# Patient Record
Sex: FEMALE | Race: WHITE | Employment: FULL TIME | URBAN - METROPOLITAN AREA
[De-identification: names, ages, dates, MRNs, and addresses within clinical notes are randomized per-mention and may not be internally consistent; named-entity substitution may affect disease eponyms.]

---

## 2023-06-02 ENCOUNTER — TELEPHONE (OUTPATIENT)
Dept: FAMILY MEDICINE CLINIC | Facility: CLINIC | Age: 53
End: 2023-06-02

## 2023-06-02 ENCOUNTER — OFFICE VISIT (OUTPATIENT)
Dept: FAMILY MEDICINE CLINIC | Facility: CLINIC | Age: 53
End: 2023-06-02
Payer: COMMERCIAL

## 2023-06-02 VITALS
OXYGEN SATURATION: 98 % | BODY MASS INDEX: 34.6 KG/M2 | SYSTOLIC BLOOD PRESSURE: 120 MMHG | HEART RATE: 79 BPM | WEIGHT: 239 LBS | RESPIRATION RATE: 18 BRPM | DIASTOLIC BLOOD PRESSURE: 84 MMHG | HEIGHT: 69.5 IN

## 2023-06-02 DIAGNOSIS — Z13.29 SCREENING FOR ENDOCRINE, NUTRITIONAL, METABOLIC AND IMMUNITY DISORDER: ICD-10-CM

## 2023-06-02 DIAGNOSIS — Z13.21 SCREENING FOR ENDOCRINE, NUTRITIONAL, METABOLIC AND IMMUNITY DISORDER: ICD-10-CM

## 2023-06-02 DIAGNOSIS — M72.2 PLANTAR FASCIITIS OF LEFT FOOT: ICD-10-CM

## 2023-06-02 DIAGNOSIS — Z00.00 ROUTINE GENERAL MEDICAL EXAMINATION AT A HEALTH CARE FACILITY: Primary | ICD-10-CM

## 2023-06-02 DIAGNOSIS — Z13.228 SCREENING FOR ENDOCRINE, NUTRITIONAL, METABOLIC AND IMMUNITY DISORDER: ICD-10-CM

## 2023-06-02 DIAGNOSIS — M54.32 SCIATICA OF LEFT SIDE: ICD-10-CM

## 2023-06-02 DIAGNOSIS — Z13.0 SCREENING FOR ENDOCRINE, NUTRITIONAL, METABOLIC AND IMMUNITY DISORDER: ICD-10-CM

## 2023-06-02 DIAGNOSIS — Z12.31 ENCOUNTER FOR SCREENING MAMMOGRAM FOR MALIGNANT NEOPLASM OF BREAST: ICD-10-CM

## 2023-06-02 DIAGNOSIS — R10.12 LUQ PAIN: ICD-10-CM

## 2023-06-02 PROCEDURE — 3008F BODY MASS INDEX DOCD: CPT | Performed by: FAMILY MEDICINE

## 2023-06-02 PROCEDURE — 99386 PREV VISIT NEW AGE 40-64: CPT | Performed by: FAMILY MEDICINE

## 2023-06-02 PROCEDURE — 3079F DIAST BP 80-89 MM HG: CPT | Performed by: FAMILY MEDICINE

## 2023-06-02 PROCEDURE — 3074F SYST BP LT 130 MM HG: CPT | Performed by: FAMILY MEDICINE

## 2023-06-02 RX ORDER — MULTIVIT-MIN/IRON/FOLIC ACID/K 18-600-40
1000 CAPSULE ORAL DAILY
COMMUNITY

## 2023-06-02 RX ORDER — ESTRADIOL 0.5 MG/1
0.5 TABLET ORAL DAILY
COMMUNITY
Start: 2023-03-14

## 2023-06-02 RX ORDER — PROGESTERONE 200 MG/1
CAPSULE ORAL
COMMUNITY
Start: 2023-03-20

## 2023-06-02 RX ORDER — CALCIUM CARBONATE 260MG(650)
100 TABLET,CHEWABLE ORAL DAILY
COMMUNITY

## 2023-06-02 NOTE — TELEPHONE ENCOUNTER
KELLY faxed to Dr. Edinson Rowland to obtain pt last pap. KELLY also faxed to [address] to obtain pt last mammogram. Both placed in Dr. Prince Mahmood folder. Sending pt [employer] message to get colonoscopy info as I was unable to find online. Will then fax KELLY.

## 2023-06-05 ENCOUNTER — LAB ENCOUNTER (OUTPATIENT)
Dept: LAB | Age: 53
End: 2023-06-05
Attending: FAMILY MEDICINE
Payer: COMMERCIAL

## 2023-06-05 DIAGNOSIS — R10.12 LUQ PAIN: ICD-10-CM

## 2023-06-05 DIAGNOSIS — Z13.228 SCREENING FOR ENDOCRINE, NUTRITIONAL, METABOLIC AND IMMUNITY DISORDER: ICD-10-CM

## 2023-06-05 DIAGNOSIS — Z13.0 SCREENING FOR ENDOCRINE, NUTRITIONAL, METABOLIC AND IMMUNITY DISORDER: ICD-10-CM

## 2023-06-05 DIAGNOSIS — Z13.29 SCREENING FOR ENDOCRINE, NUTRITIONAL, METABOLIC AND IMMUNITY DISORDER: ICD-10-CM

## 2023-06-05 DIAGNOSIS — Z13.21 SCREENING FOR ENDOCRINE, NUTRITIONAL, METABOLIC AND IMMUNITY DISORDER: ICD-10-CM

## 2023-06-05 LAB
BASOPHILS # BLD AUTO: 0.04 X10(3) UL (ref 0–0.2)
BASOPHILS NFR BLD AUTO: 0.6 %
CANCER AG19-9 SERPL-ACNC: 6.9 U/ML (ref ?–37)
EOSINOPHIL # BLD AUTO: 0.06 X10(3) UL (ref 0–0.7)
EOSINOPHIL NFR BLD AUTO: 0.8 %
ERYTHROCYTE [DISTWIDTH] IN BLOOD BY AUTOMATED COUNT: 12.8 %
HCT VFR BLD AUTO: 40.4 %
HGB BLD-MCNC: 13.3 G/DL
IMM GRANULOCYTES # BLD AUTO: 0.02 X10(3) UL (ref 0–1)
IMM GRANULOCYTES NFR BLD: 0.3 %
LIPASE SERPL-CCNC: 32 U/L (ref 13–75)
LYMPHOCYTES # BLD AUTO: 2.76 X10(3) UL (ref 1–4)
LYMPHOCYTES NFR BLD AUTO: 39 %
MAGNESIUM SERPL-MCNC: 2 MG/DL (ref 1.6–2.6)
MCH RBC QN AUTO: 29.8 PG (ref 26–34)
MCHC RBC AUTO-ENTMCNC: 32.9 G/DL (ref 31–37)
MCV RBC AUTO: 90.6 FL
MONOCYTES # BLD AUTO: 0.4 X10(3) UL (ref 0.1–1)
MONOCYTES NFR BLD AUTO: 5.7 %
NEUTROPHILS # BLD AUTO: 3.79 X10 (3) UL (ref 1.5–7.7)
NEUTROPHILS # BLD AUTO: 3.79 X10(3) UL (ref 1.5–7.7)
NEUTROPHILS NFR BLD AUTO: 53.6 %
PLATELET # BLD AUTO: 228 10(3)UL (ref 150–450)
RBC # BLD AUTO: 4.46 X10(6)UL
TSI SER-ACNC: 1.5 MIU/ML (ref 0.36–3.74)
VIT D+METAB SERPL-MCNC: 20.5 NG/ML (ref 30–100)
WBC # BLD AUTO: 7.1 X10(3) UL (ref 4–11)

## 2023-06-05 PROCEDURE — 82306 VITAMIN D 25 HYDROXY: CPT | Performed by: FAMILY MEDICINE

## 2023-06-05 PROCEDURE — 83690 ASSAY OF LIPASE: CPT | Performed by: FAMILY MEDICINE

## 2023-06-05 PROCEDURE — 84443 ASSAY THYROID STIM HORMONE: CPT | Performed by: FAMILY MEDICINE

## 2023-06-05 PROCEDURE — 83735 ASSAY OF MAGNESIUM: CPT | Performed by: FAMILY MEDICINE

## 2023-06-05 PROCEDURE — 85025 COMPLETE CBC W/AUTO DIFF WBC: CPT | Performed by: FAMILY MEDICINE

## 2023-06-05 PROCEDURE — 86301 IMMUNOASSAY TUMOR CA 19-9: CPT | Performed by: FAMILY MEDICINE

## 2023-06-05 NOTE — TELEPHONE ENCOUNTER
Mammogram and pap report received. Placed in Dr. Alisson Richardson for review and will then send to scan.

## 2023-06-08 ENCOUNTER — PATIENT MESSAGE (OUTPATIENT)
Dept: FAMILY MEDICINE CLINIC | Facility: CLINIC | Age: 53
End: 2023-06-08

## 2023-06-08 DIAGNOSIS — E66.9 CLASS 1 OBESITY WITHOUT SERIOUS COMORBIDITY WITH BODY MASS INDEX (BMI) OF 34.0 TO 34.9 IN ADULT, UNSPECIFIED OBESITY TYPE: Primary | ICD-10-CM

## 2023-06-09 NOTE — TELEPHONE ENCOUNTER
From: Rosetta Connelly  To: Stephenie Duke MD  Sent: 6/8/2023 2:27 PM CDT  Subject: test results    Hi Dr Francisco Anderson,   I did all the blood tests that did not require fasting. How do the results look? No sign of the cancer my chiropractor had brought to my attention. .. I hope? ? Based on these results do you think I'm good to go on the weight loss medicine we talked about? Thanks! It was wonderful to meet you.

## 2023-06-09 NOTE — TELEPHONE ENCOUNTER
----- Message from Nancy Centeno MD sent at 6/6/2023 11:47 AM CDT -----  Vitamin D level is low. Please send Rx for 50,000U per week for 3 months.

## 2023-06-09 NOTE — TELEPHONE ENCOUNTER
Everything looks good,no cancer, ok to start weight loss medication, ok to send Weogvy 0.25mg weekly, pt has obesity.

## 2023-06-10 ENCOUNTER — MED REC SCAN ONLY (OUTPATIENT)
Dept: FAMILY MEDICINE CLINIC | Facility: CLINIC | Age: 53
End: 2023-06-10

## 2023-08-10 ENCOUNTER — TELEPHONE (OUTPATIENT)
Dept: FAMILY MEDICINE CLINIC | Facility: CLINIC | Age: 53
End: 2023-08-10

## 2023-08-10 DIAGNOSIS — U07.1 COVID-19: Primary | ICD-10-CM

## 2023-08-10 NOTE — TELEPHONE ENCOUNTER
Pt states she was on a cruise & got home 2 days ago & tested positive for COVID. She had a 9 hour flight home from her cruise. She c/o chills, body aches, cough, post nasal drip. She denies SOB but does c/o chest pain when she coughs or takes a deep breath. OTC Ibuprofen/Tylenol, Mucinex, to help symptoms. ER precautions discussed. Pt asking if she needs anything else or if you can do phone appt with her tomorrow? She was not sure she wanted to take Paxlovid. Please advise on any orders for pt.

## 2023-08-10 NOTE — TELEPHONE ENCOUNTER
Covid x 1 day. Chills, headache, chest pain when coughing and post nasal drip.      Pt is a  and protocol was telehealth visit, telehealth provider stated she can't give medication and needs advice from PCP

## 2023-08-11 ENCOUNTER — E-VISIT (OUTPATIENT)
Dept: FAMILY MEDICINE CLINIC | Facility: CLINIC | Age: 53
End: 2023-08-11

## 2023-08-11 DIAGNOSIS — U07.1 COVID-19: Primary | ICD-10-CM

## 2023-08-11 NOTE — PROGRESS NOTES
Covid positive, fluids, Tylenol, rest, Paxlovid should help. F/u on Monday if not better. Rx Paxlovid sent.

## 2023-09-20 ENCOUNTER — PATIENT MESSAGE (OUTPATIENT)
Dept: FAMILY MEDICINE CLINIC | Facility: CLINIC | Age: 53
End: 2023-09-20

## 2023-09-20 NOTE — TELEPHONE ENCOUNTER
From: Shannon Beltran  To: Valerie Umanzor  Sent: 9/20/2023 2:37 PM CDT  Subject: Medication    Hi,  Dr Jignesh Stallings prescribed JXHMIP for me the first week of June. It has been out of stock, on backorder this whole time, \"should\" have it by the end of September. I just re checked with [facility]. They said that they will not have it & there is no longer a timeline in which they expect it. The pharmacist advised me to. .. 1. call my doctor to see if she has any samples   2. Change the prescription to Ozempic as they have the .25 in that (I advised her that was not an option)  3. They have the higher dosage (1.7) , some people have been injecting partial shots     Any thoughts?    Thanks in advance chlorhexidine

## 2023-11-13 ENCOUNTER — PATIENT MESSAGE (OUTPATIENT)
Dept: FAMILY MEDICINE CLINIC | Facility: CLINIC | Age: 53
End: 2023-11-13

## 2023-11-13 NOTE — TELEPHONE ENCOUNTER
From: Nadine Huertas  To: Silvana Clark  Sent: 11/13/2023 10:32 AM CST  Subject: medication    Hi,    DR Green prescribed LTTUZP for me back in June. It has not been available at all. I tried every pharmacy in a hundred mile radius, multiple times. There is no expected date for [facility]. Can the [place] be switched to [facility] [facility] pharmacist at [facility] told me they can sometimes get that. Thank you!

## 2024-01-16 ENCOUNTER — TELEMEDICINE (OUTPATIENT)
Dept: INTERNAL MEDICINE CLINIC | Facility: CLINIC | Age: 54
End: 2024-01-16
Payer: COMMERCIAL

## 2024-01-16 VITALS — HEIGHT: 69 IN | WEIGHT: 242 LBS | BODY MASS INDEX: 35.84 KG/M2

## 2024-01-16 DIAGNOSIS — E78.5 HYPERLIPIDEMIA, UNSPECIFIED HYPERLIPIDEMIA TYPE: ICD-10-CM

## 2024-01-16 DIAGNOSIS — E66.01 CLASS 2 SEVERE OBESITY WITH SERIOUS COMORBIDITY AND BODY MASS INDEX (BMI) OF 35.0 TO 35.9 IN ADULT, UNSPECIFIED OBESITY TYPE  (HCC): ICD-10-CM

## 2024-01-16 DIAGNOSIS — Z51.81 ENCOUNTER FOR THERAPEUTIC DRUG LEVEL MONITORING: Primary | ICD-10-CM

## 2024-01-16 DIAGNOSIS — R73.03 PREDIABETES: ICD-10-CM

## 2024-01-16 RX ORDER — TIRZEPATIDE 5 MG/.5ML
5 INJECTION, SOLUTION SUBCUTANEOUS WEEKLY
Qty: 2 ML | Refills: 0 | Status: SHIPPED | OUTPATIENT
Start: 2024-01-16

## 2024-01-16 RX ORDER — ONDANSETRON 4 MG/1
4 TABLET, ORALLY DISINTEGRATING ORAL EVERY 8 HOURS PRN
Qty: 15 TABLET | Refills: 0 | Status: SHIPPED | OUTPATIENT
Start: 2024-01-16

## 2024-01-16 NOTE — PATIENT INSTRUCTIONS
1600 calories a day    Moderate Carb (30/35/35)  121g  Protein    63g  Fats    141g  Carbs    Lower Carb (40/40/20)  161g  Protein    71g  Fats    80g  Carbs      We are here to support you with weight loss, but please remember that you still need your primary care provider for your routine health maintenance.      PLAN:  Will begin zepbound  Referral for dietician  Follow up with me in 3 months  Schedule follow up appointments: Tor Herrmann (dietitian) or Nasrin King (presurgery dietitian)   Check for insurance coverage for dietitian and labwork prior to scheduling appointment.     Please try to work on the following dietary changes:  Goals: Aim for 20-30 grams of protein/ meal  Eat 4-6 vegetables/day  Avoid skipping meals- eat every 4-5 hours  Aim for 3 meals/day  2. Drink lots of water and cut down on soda/juice consumption if soda/juice drinker  3. Focus on protein: (15-30 grams with each meal) ie. greek yogurt, cottage cheese, string cheese, hard boiled eggs  4. Healthy snacks: peanut butter and apples, hummus and carrots, berries, nuts (1/4 cup), tuna and crackers                 Protein Shakes: Premier protein or Core Power                Protein Bars: Rx Bars, Oatmega, Power Crunch                 Sargento balanced breaks (cheese and nuts)- without chocolate  5. Reduce carbohydrates which includes sweets as well as rice, pasta, potatoes, bread, corn and instead choose whole grain options or more protein or vegetables (4-6 servings of vegetables per day)  6. Get a good night of sleep  7. Try to decrease stress in life     Please download apps:  1. My Fitness Pal, Lose it, My Net Diary petrona to help you to monitor daily dietary intake and you will be able to see if you are eating the right amount of calories, protein, carbs                With My Fitness Pal-->When you set-up the petrona or need to adjust settings:                Goals should include:                 Lose 1.5-2 lbs per week                Activity  level: not very active (can't count exercise towards calorie number per day)                   ** Daily INPUT> Look at nutrition section-- \"nutrients\" and it will break down your macros for the day (ie. Protein, carbs, fibers, sugars and fats). Try to stay within these numbers daily     2. \"7 minute workout\" to help with exercise/activity which takes 7 minutes of your day and that you can do at home!   3. \"Calm\" or \"Headspace\" which helps with mindfulness, meditation, clarity, sleep, and yanni to your daily life.   4. [employer] blog for healthy recipe ideas  5. [place] for low carb resources    HIGH PROTEIN SNACK IDEAS  -cottage cheese  -plain yogurt  -kefir  -hard-boiled eggs  -natural cheeses  -nuts (measure portion size)   -unsweetened nut butters  -dried edamame   -sofia seeds soaked in water or almond milk  -soy nuts  -cured meats (monitor for sodium issues)   -hummus with vegetables  -bean dip with vegetables     FRUIT  Low carb fruit options   Raspberries: Half a cup (60 grams) contains 3 grams of carbs.  Blackberries: Half a cup (70 grams) contains 4 grams of carbs.  Strawberries: Half a cup (100 grams) contains 6 grams of carbs.  Blueberries: Half a cup (50 grams) contains 6 grams of carbs.  Plum: One medium-sized (80 grams) contains 6 grams of carbs.     VEGETABLES  Low carb vegetables            Delicious and Healthy Snacks      All snacks are under 200 calories and chocked full of nutrition!  Quick Caprese salad- Mix 1 cup grape tomatoes, 1 oz. fresh mini mozzarella balls, 1 teaspoon olive oil, ½ tsp. balsamic vinegar.  Add fresh or dried basil for flavor.  Cottage cheese and berries- Top ½ cup low fat cottage cheese with 1/2 cup of blueberries  Peanut butter and apple slices- Cut up an apple and dip in 1 Tablespoon of peanut butter  Hummus and veggies- Dip baby carrots, pepper strips or snap peas in ¼ cup of hummus  Nuts- Munch on 1 ounce of any kind of nut (about ¼ cup)  Wrap it up- Wrap 2 ounces  of low sodium, nitrate free turkey around red pepper or cucumber slices  Yogurt and berries- 1/2 cup berries on a 6 ounce container of plain or low sugar fruit flavored 2% Greek yogurt (less than 15 grams sugar per serving).  Chobani (Less Sugar)® or Siggis® are examples.  Hardboiled egg and fruit- 1 hardboiled egg and a tangerine or other small serving of fruit  Tuna and avocado- Put 2 oz tuna (one pouch) on 1/3 of an avocado or 2 Tbsp guacamole and top with salsa  String cheese and veggies- one piece cheese (3/4 ounce) and 1 cup snap peas or other vegetables  Cauliflower rice and cheese- Sprinkle 1/4 cup shredded cheese on 1 cup cauliflower rice and microwave until cheese melts  Deviled eggs- 3 deviled egg halves  Bean dip and veggies-½ cup refried beans with ¼ cup shredded cheese melted on top. Use as a dip for celery or red peppers strips or just eat plain  S[place]ento Balanced Breaks® (or make your own-1/2 ounce nuts, 1/2 ounce cheese and 1 teaspoon dried fruit)  Edamame-1 cup warmed and lightly salted  Low fat chicken, egg, or tuna salad- Mix 2 oz chicken, tuna, or 2 eggs with 1 tsp kuhn,1 tsp Chris mustard and 1 tsp plain yogurt.  Add chopped celery, onions, walnuts, Granny smith apples or dried cranberries to make it interesting.  [place] break- Turkey, chicken, peanut butter or almond butter on 1 slice whole grain bread   Protein shake-Anglin®, Core Power®, Orgain®, Premier Protein®  Protein bar-Quest ®, Oatmega®, RX Bar®

## 2024-01-16 NOTE — PROGRESS NOTES
[facility] WEIGHT MANAGEMENT VIRTUAL VIDEO ENCOUNTER     Cristina Alvarado verbally consents to a Virtual/Telephone Check-In service on 01/16/24  Patient understands and accepts financial responsibility for any deductible, co-insurance and/or co-pays associated with this service.    HISTORY OF PRESENT ILLNESS  Chief Complaint   Patient presents with    Weight Problem     Cristina Alvarado is a 53 year old female new patient, is being evaluated as a video visit using Telemedicine with live, interactive video and audio. Cristina Alvarado for initiation of medical weight loss program.  Patient presents today with c/o excess weight. Referred by     30 lbs weight loss & lower A1C     no medications, ww,ejisaac abdul,intermittent fasting, slim fast, atkins, keto   always been heavy. late night eating, stressful job, menopause     Has entered menopause and has noticed weight gain  Was recently diagnosed with prediabetes    PCP tried to prescribe Wegovy but it wasn't available  OB/Gyne prescribed Mounjaro    Currently on HRT    Denies chest pain, shortness of breath, dizziness, blurred vision, headache, paresthesia, nausea/vomiting.       Reviewed [facility] patient contract: Readiness for Lifestyle change: 10/10, Interest in Medication: 10/10, Bariatric surgery interest: 0/10    Weight  Starting weight: 242lbs, 5'9\"  Max weight:  Lowest weight:    Wt Readings from Last 6 Encounters:   01/16/24 242 lb (109.8 kg)   06/02/23 239 lb (108.4 kg)        Typical diet   Breakfast Lunch Dinner Snacks Fluids   Typically skips bc doing IF     Soups  Salads    If working it is harder Beef stew   Craving sweets at night Water  coffee       Social hx and lifestyle reviewed:    Work:   Marital status: Yes  Support: yes  Tobacco use: none  ETOH use:   1 per/week  Supplements: vitamin D, Magnesium Citrate, fish oil   Exercise: pilates, walking  Stress level: 8/10  Sleep hours and integrity: varies, flies all night    MEDICAL  HISTORY  PMH reviewed:   Cardiac disorders:negative   Depression/anxiety: Yes, with menopause  Glaucoma: negative  Kidney stones: negative  Eating disorder: negative  Migraines/seizures: negative  Joint-related conditions: bilateral hips  Liver disease: negative  Thyroid disease: negative  Constipation: negative  Diabetes: Prediabetes 6.4%  Sleep Apnea hx: n/a   Cancer hx: negative  DVT: negative  Family or personal history of Pancreatic issues / Medullary Thyroid Cancer: negative  History of bariatric surgery: negative    FMH reviewed: obesity in parent/s or sibling:     REVIEW OF SYSTEMS  GENERAL: feels well otherwise, and negative fatigue   SKIN: denies any rashes to skin folds   HEENT: denies neck thickening  LUNGS: denies shortness of breath with exertion, no apnea  CARDIOVASCULAR: denies chest pain on exertion, denies palpitations or pedal edema  GI: denies abdominal pain, distention, No N/V/D/C  MUSCULOSKELETAL: see above  NEURO: denies headaches or dizziness  ENDOCRINE: denies any excess hunger, urination or thirst, denies any purple striae  PSYCH: denies change in behavior or mood, denies feeling sad or depressed    EXAM  Ht 5' 9\" (1.753 m)   Wt 242 lb (109.8 kg)   LMP 02/23/2023 (Exact Date)   BMI 35.74 kg/m² , Percent body fat: unable to complete (will perform in office)   Reviewed recent set of vitals       GENERAL: well developed, well nourished, in no apparent distress, speaking in full sentences comfortably   SKIN: warm, pink, dry without rashes to exposed area   EYES: conjunctiva pink  HEENT: atraumatic, normocephalic  LUNGS: normal work of breathing, non labored  CARDIO: normal work, no exertion  EXTREMITIES: no cyanosis, no clubbing, no edema  NEURO: Oriented times three  PSYCH: pleasant, cooperative, normal mood and affect    No results found for: \"GLU\", \"BUN\", \"BUNCREA\", \"CREATSERUM\", \"ANIONGAP\", \"GFR\", \"GFRNAA\", \"GFRAA\", \"CA\", \"OSMOCALC\", \"ALKPHO\", \"AST\", \"ALT\", \"ALKPHOS\", \"BILT\", \"TP\",  \"ALB\", \"GLOBULIN\", \"AGRATIO\", \"NA\", \"K\", \"CL\", \"CO2\"  No results found for: \"EAG\", \"A1C\"  No results found for: \"CHOLEST\", \"TRIG\", \"HDL\", \"LDL\", \"VLDL\", \"TCHDLRATIO\", \"NONHDLC\", \"CHOLHDLRATIO\", \"CALCNONHDL\"  No results found for: \"B12\", \"VITB12\"  Lab Results   Component Value Date    VITD 20.5 (L) 06/05/2023       Current Outpatient Medications on File Prior to Visit   Medication Sig Dispense Refill    estradiol 0.5 MG Oral Tab Take 1 tablet (0.5 mg total) by mouth daily.      progesterone 200 MG Oral Cap TAKE 1 CAPSULE BY MOUTH EVERY EVENING FOR 12 DAYS SEQUENTIALLY PER 28 DAY CYCLE      Vitamin D, Cholecalciferol, 25 MCG (1000 UT) Oral Tab Take 1,000 Units by mouth daily.      Magnesium Citrate 100 MG Oral Tab Take 100 mg by mouth daily.       No current facility-administered medications on file prior to visit.       ASSESSMENT/PLAN    ICD-10-CM    1. Encounter for therapeutic drug level monitoring  Z51.81 OP REFERRAL TO DIETITIAN [facility] ONLY)      2. Class 2 severe obesity with serious comorbidity and body mass index (BMI) of 35.0 to 35.9 in adult, unspecified obesity type  (HCC)  E66.01 OP REFERRAL TO DIETITIAN [facility] ([facility] USE ONLY)    Z68.35       3. Prediabetes  R73.03       4. Hyperlipidemia, unspecified hyperlipidemia type  E78.5         Initial Weight Data and Goal Weight Loss:     Initial consult:  lbs on /2021, Down  Lb:  lbs total     PLAN   Initial Weight: 242lbs  Initial Weight Date: 1/16/24  Today's Weight: 242lbs  5% Goal: 12.1lbs  10% Goal: 24.2lbs  Total Weight Loss:     Baseline labs reviewed   Will start medications: Will begin Zepbound 5mg weekly - denies any personal or family history of pancreatitis, pancreatic cancer, thyroid cancer, MEN2 - discussed MOA, advised side effects and adverse effects of medication.  --advised of side effects and adverse effects of this medication  Contradictions: EKG prior to stimulant  Body composition will be done in the office   Begin logging foods -  discussed macronutrient  Prediabetes - continue to work on low carb, possible addition of metformin  HLD - lifestyle changes  Decrease carbs, increase protein, no skipping meals   -low carb high protein  -portion control  -Limit carbohydrates  -Eat breakfast every day   -Don't skip meals   -Read nutrition labels and keep a food log  -drink a lot of water 65 oz of water per day  - Do not drink your calories (no soda, juice, high calorie coffee drinks, limit alcohol)  - Do not eat late at night  Needs to incorporate exercise regimen FITTE: ACSM recommendations (150-300 minutes/ week in active weight loss)   Follow up with dietitian and psychologist as recommended.  Discussed the role of sleep and stress in weight management.  Counseled on comprehensive weight loss plan including attention to nutrition, exercise and behavior/stress management for success. See patient instruction below for more details.    Total time spent on chart review, pre-charting, obtaining history, counseling, and educating, reviewing labs was 45 minutes.       Patient Instructions   1600 calories a day    Moderate Carb (30/35/35)  121g  Protein    63g  Fats    141g  Carbs    Lower Carb (40/40/20)  161g  Protein    71g  Fats    80g  Carbs      We are here to support you with weight loss, but please remember that you still need your primary care provider for your routine health maintenance.      PLAN:  Will begin zepbound  Referral for dietician  Follow up with me in 3 months  Schedule follow up appointments: Tor Herrmann (dietitian) or Nasrin King (presurgery dietitian)   Check for insurance coverage for dietitian and labwork prior to scheduling appointment.     Please try to work on the following dietary changes:  Goals: Aim for 20-30 grams of protein/ meal  Eat 4-6 vegetables/day  Avoid skipping meals- eat every 4-5 hours  Aim for 3 meals/day  2. Drink lots of water and cut down on soda/juice consumption if soda/juice drinker  3. Focus on  protein: (15-30 grams with each meal) ie. greek yogurt, cottage cheese, string cheese, hard boiled eggs  4. Healthy snacks: peanut butter and apples, hummus and carrots, berries, nuts (1/4 cup), tuna and crackers                 Protein Shakes: Premier protein or Core Power                Protein Bars: Rx Bars, Oatmega, Power Crunch                 S[place]ento balanced breaks (cheese and nuts)- without chocolate  5. Reduce carbohydrates which includes sweets as well as rice, pasta, potatoes, bread, corn and instead choose whole grain options or more protein or vegetables (4-6 servings of vegetables per day)  6. Get a good night of sleep  7. Try to decrease stress in life     Please download apps:  1. My Fitness Pal, Lose it, My Net Diary petrona to help you to monitor daily dietary intake and you will be able to see if you are eating the right amount of calories, protein, carbs                With My Fitness Pal-->When you set-up the petrona or need to adjust settings:                Goals should include:                 Lose 1.5-2 lbs per week                Activity level: not very active (can't count exercise towards calorie number per day)                   ** Daily INPUT> Look at nutrition section-- \"nutrients\" and it will break down your macros for the day (ie. Protein, carbs, fibers, sugars and fats). Try to stay within these numbers daily     2. \"7 minute workout\" to help with exercise/activity which takes 7 minutes of your day and that you can do at home!   3. \"Calm\" or \"Headspace\" which helps with mindfulness, meditation, clarity, sleep, and yanni to your daily life.   4. [employer] blog for healthy recipe ideas  5. [employer] for low carb resources    HIGH PROTEIN SNACK IDEAS  -cottage cheese  -plain yogurt  -kefir  -hard-boiled eggs  -natural cheeses  -nuts (measure portion size)   -unsweetened nut butters  -dried edamame   -sofia seeds soaked in water or almond milk  -soy nuts  -cured meats (monitor for sodium  issues)   -hummus with vegetables  -bean dip with vegetables     FRUIT  Low carb fruit options   Raspberries: Half a cup (60 grams) contains 3 grams of carbs.  Blackberries: Half a cup (70 grams) contains 4 grams of carbs.  Strawberries: Half a cup (100 grams) contains 6 grams of carbs.  Blueberries: Half a cup (50 grams) contains 6 grams of carbs.  Plum: One medium-sized (80 grams) contains 6 grams of carbs.     VEGETABLES  Low carb vegetables            Delicious and Healthy Snacks      All snacks are under 200 calories and chocked full of nutrition!  Quick Caprese salad- Mix 1 cup grape tomatoes, 1 oz. fresh mini mozzarella balls, 1 teaspoon olive oil, ½ tsp. balsamic vinegar.  Add fresh or dried basil for flavor.  Cottage cheese and berries- Top ½ cup low fat cottage cheese with 1/2 cup of blueberries  Peanut butter and apple slices- Cut up an apple and dip in 1 Tablespoon of peanut butter  Hummus and veggies- Dip baby carrots, pepper strips or snap peas in ¼ cup of hummus  Nuts- Munch on 1 ounce of any kind of nut (about ¼ cup)  Wrap it up- Wrap 2 ounces of low sodium, nitrate free turkey around red pepper or cucumber slices  Yogurt and berries- 1/2 cup berries on a 6 ounce container of plain or low sugar fruit flavored 2% Greek yogurt (less than 15 grams sugar per serving).  Chobani (Less Sugar)® or Siggis® are examples.  Hardboiled egg and fruit- 1 hardboiled egg and a tangerine or other small serving of fruit  Tuna and avocado- Put 2 oz tuna (one pouch) on 1/3 of an avocado or 2 Tbsp guacamole and top with salsa  String cheese and veggies- one piece cheese (3/4 ounce) and 1 cup snap peas or other vegetables  Cauliflower rice and cheese- Sprinkle 1/4 cup shredded cheese on 1 cup cauliflower rice and microwave until cheese melts  Deviled eggs- 3 deviled egg halves  Bean dip and veggies-½ cup refried beans with ¼ cup shredded cheese melted on top. Use as a dip for celery or red peppers strips or just eat  plain  [place] Balanced Breaks® (or make your own-1/2 ounce nuts, 1/2 ounce cheese and 1 teaspoon dried fruit)  Edamame-1 cup warmed and lightly salted  Low fat chicken, egg, or tuna salad- Mix 2 oz chicken, tuna, or 2 eggs with 1 tsp kuhn,1 tsp Chris mustard and 1 tsp plain yogurt.  Add chopped celery, onions, walnuts, Granny smith apples or dried cranberries to make it interesting.  [place] break- Turkey, chicken, peanut butter or almond butter on 1 slice whole grain bread   Protein shake-Anglin®, Core Power®, Orgain®, Premier Protein®  Protein bar-Quest ®, Oatmega®, RX Bar®      No follow-ups on file.    Patient verbalizes understanding.    Pt understands phone/video evaluation is not a substitute for face to face examination or emergency care. Pt advised to go to the ER or call 911 for worsening symptoms or acute distress.       Please note that the following visit was completed using two-way, real-time interactive audio and/or video communication.  This has been done in good clarke to provide continuity of care in the best interest of the provider-patient relationship, due to the ongoing public health crisis/national emergency and because of restrictions of visitation.  There are limitations of this visit as no physical exam could be performed.  Every conscious effort was taken to allow for sufficient and adequate time.  This billing was spent on reviewing labs, medications, radiology tests and decision making.  Appropriate medical decision-making and tests are ordered as detailed in the plan of care above.     Teressa Andrew PA-C

## 2024-03-14 ENCOUNTER — PATIENT MESSAGE (OUTPATIENT)
Dept: FAMILY MEDICINE CLINIC | Facility: CLINIC | Age: 54
End: 2024-03-14

## 2024-03-15 NOTE — TELEPHONE ENCOUNTER
From: Cristina Alvarado  To: Veronica Green  Sent: 3/14/2024 4:29 PM CDT  Subject: arm pain     Hi Dr Green,  For a couple weeks, I've had a pain in my right upper arm. It's below my shoulder, above my elbow, on the outside of my arm. It hurts more when I reach behind my back (like to grab something from the backseat of the car). I am a side sleeper, I also have numbness in my hand when I wake up in the morning. I don't remember injuring it at all. 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 My right arm is also my flying arm. 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 Like from repetitive motion? I asked my nurse sister for advice, she thinks its either a neck problem or something called impingement syndrome??

## 2024-04-09 RX ORDER — TIRZEPATIDE 7.5 MG/.5ML
7.5 INJECTION, SOLUTION SUBCUTANEOUS WEEKLY
Qty: 2 ML | Refills: 0 | Status: CANCELLED | OUTPATIENT
Start: 2024-04-09

## 2024-04-09 RX ORDER — TIRZEPATIDE 5 MG/.5ML
5 INJECTION, SOLUTION SUBCUTANEOUS WEEKLY
Qty: 2 ML | Refills: 0 | OUTPATIENT
Start: 2024-04-09

## 2024-04-09 RX ORDER — TIRZEPATIDE 5 MG/.5ML
5 INJECTION, SOLUTION SUBCUTANEOUS WEEKLY
Refills: 0 | OUTPATIENT
Start: 2024-04-09

## 2024-04-09 NOTE — TELEPHONE ENCOUNTER
Requesting zepbound  LOV: 1/16/24  RTC: not noted  Last Relevant Labs: na  Filled: 2/22/24 #2ml with 0 refills  zepbound 5 mg    Future Appointments   Date Time Provider Department Center   5/20/2024  9:00 AM [id number]

## 2024-04-10 NOTE — TELEPHONE ENCOUNTER
Requesting   Requested Prescriptions     Pending Prescriptions Disp Refills    Tirzepatide-Weight Management (ZEPBOUND) 5 MG/0.5ML Subcutaneous Solution Auto-injector 2 mL 0     Sig: Inject 5 mg into the skin once a week.      LOV: 1/16/24  RTC:   Last Relevant Labs:   Filled: 2/22/24 #2ml with 0 refills    Future Appointments   Date Time Provider Department Center   5/20/2024  9:00 AM [id number]      Patient would like to stay on current dose

## 2024-04-11 RX ORDER — TIRZEPATIDE 5 MG/.5ML
5 INJECTION, SOLUTION SUBCUTANEOUS WEEKLY
Qty: 2 ML | Refills: 0 | Status: SHIPPED | OUTPATIENT
Start: 2024-04-11

## 2024-04-22 ENCOUNTER — PATIENT MESSAGE (OUTPATIENT)
Dept: INTERNAL MEDICINE CLINIC | Facility: CLINIC | Age: 54
End: 2024-04-22

## 2024-04-22 ENCOUNTER — TELEMEDICINE (OUTPATIENT)
Dept: FAMILY MEDICINE CLINIC | Facility: CLINIC | Age: 54
End: 2024-04-22
Payer: COMMERCIAL

## 2024-04-22 DIAGNOSIS — S46.211A STRAIN OF RIGHT BICEPS TENDON: Primary | ICD-10-CM

## 2024-04-22 RX ORDER — METHYLPREDNISOLONE 4 MG/1
TABLET ORAL
Qty: 1 EACH | Refills: 0 | Status: SHIPPED | OUTPATIENT
Start: 2024-04-22

## 2024-04-22 RX ORDER — LIDOCAINE 50 MG/G
1 PATCH TOPICAL EVERY 24 HOURS
Qty: 30 PATCH | Refills: 3 | Status: SHIPPED | OUTPATIENT
Start: 2024-04-22

## 2024-04-22 NOTE — PROGRESS NOTES
Cristina ARCENIO Christiano verbally consents to a Virtual/Video Check-In service on 04/22/24.    Time spent: 22 min.    CHIEF COMPLAIN: right arm pain.     HPI: R upper arm has been hurting for  60days .Dull,throbbing  in character. Radiation to the upper mid R arm  . Moderate.  No weakness.  No numbness or tingling. Worsening. Movement makes it worse and rest makes it better.Rasing movement above the shoulder line is very uncomfortable for the pt.Also worse at night, some tingling and numbness when sleeping on the right arm.No associated neck pain.  Injury mechanism:none.  No redness, bruising, lacerations.No weakness.  Current medications:OTC and chiropractic care not effective.  H/o surgery of the shoulder:none.  Pt is , long flights.      Current Outpatient Medications   Medication Sig Dispense Refill    methylPREDNISolone (MEDROL) 4 MG Oral Tablet Therapy Pack As directed. 1 each 0    lidocaine (LIDODERM) 5 % External Patch Place 1 patch onto the skin daily. On the right arm. 30 patch 3    Tirzepatide-Weight Management (ZEPBOUND) 5 MG/0.5ML Subcutaneous Solution Auto-injector Inject 5 mg into the skin once a week. 2 mL 0    Tirzepatide-Weight Management (ZEPBOUND) 5 MG/0.5ML Subcutaneous Solution Auto-injector Inject 5 mg into the skin once a week. 2 mL 0    Tirzepatide-Weight Management (ZEPBOUND) 5 MG/0.5ML Subcutaneous Solution Auto-injector Inject 5 mg into the skin once a week. 2 mL 0    ondansetron 4 MG Oral Tablet Dispersible Take 1 tablet (4 mg total) by mouth every 8 (eight) hours as needed for Nausea. 15 tablet 0    estradiol 0.5 MG Oral Tab Take 1 tablet (0.5 mg total) by mouth daily.      progesterone 200 MG Oral Cap TAKE 1 CAPSULE BY MOUTH EVERY EVENING FOR 12 DAYS SEQUENTIALLY PER 28 DAY CYCLE      Vitamin D, Cholecalciferol, 25 MCG (1000 UT) Oral Tab Take 1,000 Units by mouth daily.      Magnesium Citrate 100 MG Oral Tab Take 100 mg by mouth daily.        No past medical history on file.   No  past surgical history on file.   Social History:   Social History     Socioeconomic History    Marital status:                ROS:  GEN: no fever, no chills, no fatigue  CHEST: no chest pains.  SKIN: no rashes  HEM: no ecchymoses  JOINTS: no other joints pain.  NEURO: no tingling, no weakness, no abnormal sensation.      LMP 02/23/2023 (Exact Date)     PE:  GENERAL: in no apparent distress  HEENT: normal voice  LUNGS: no SOB  PSYCH: normal mood.   R shoulder: limited extension to 90 degrees, full extension, internal and external rotation normal,      A/P    ICD-10-CM    1. Strain of right biceps tendon  S46.211A          Requested Prescriptions     Signed Prescriptions Disp Refills    methylPREDNISolone (MEDROL) 4 MG Oral Tablet Therapy Pack 1 each 0     Sig: As directed.    lidocaine (LIDODERM) 5 % External Patch 30 patch 3     Sig: Place 1 patch onto the skin daily. On the right arm.     Rest, ice.   F/u in one week if not better.

## 2024-04-23 NOTE — TELEPHONE ENCOUNTER
From: Cristina Alvarado  To: Teressa Andrew  Sent: 4/22/2024 2:55 PM CDT  Subject: Zepbound    FYI, (but I'm sure you are aware) Zepbound is on indefinite back order. I've been without it for over a week.  any ideas?  It's been working wonders for me.   Thanks

## 2024-04-24 NOTE — TELEPHONE ENCOUNTER
Patient can only find 2.5 mg  Requesting   Requested Prescriptions     Pending Prescriptions Disp Refills    Tirzepatide-Weight Management (ZEPBOUND) 2.5 MG/0.5ML Subcutaneous Solution Auto-injector 2 mL 0     Sig: Inject 2.5 mg into the skin once a week for 4 doses.      LOV: 1/16/24  RTC:   Last Relevant Labs:   Filled: 4/11/24 #2ml with 0 refills    Future Appointments   Date Time Provider Department Center   5/20/2024  9:00 AM [id number]

## 2024-04-26 RX ORDER — TIRZEPATIDE 2.5 MG/.5ML
2.5 INJECTION, SOLUTION SUBCUTANEOUS WEEKLY
Qty: 2 ML | Refills: 0 | Status: SHIPPED | OUTPATIENT
Start: 2024-04-26 | End: 2024-05-18

## 2024-06-08 RX ORDER — TIRZEPATIDE 5 MG/.5ML
5 INJECTION, SOLUTION SUBCUTANEOUS WEEKLY
Qty: 2 ML | Refills: 0 | Status: CANCELLED | OUTPATIENT
Start: 2024-06-08

## 2024-06-11 NOTE — TELEPHONE ENCOUNTER
Requesting   Requested Prescriptions     Pending Prescriptions Disp Refills    Tirzepatide-Weight Management (ZEPBOUND) 7.5 MG/0.5ML Subcutaneous Solution Auto-injector 2 mL 0     Sig: Inject 7.5 mg into the skin once a week for 4 doses.      LOV: 4/224/24  RTC:   Last Relevant Labs:   Filled: 4/11/24 #2ml with 0 refills    No future appointments.

## 2024-06-12 RX ORDER — TIRZEPATIDE 7.5 MG/.5ML
7.5 INJECTION, SOLUTION SUBCUTANEOUS WEEKLY
Qty: 2 ML | Refills: 0 | Status: SHIPPED | OUTPATIENT
Start: 2024-06-12 | End: 2024-07-04

## 2024-06-18 ENCOUNTER — PATIENT MESSAGE (OUTPATIENT)
Facility: CLINIC | Age: 54
End: 2024-06-18

## 2024-06-18 DIAGNOSIS — E66.01 CLASS 2 SEVERE OBESITY WITH SERIOUS COMORBIDITY AND BODY MASS INDEX (BMI) OF 35.0 TO 35.9 IN ADULT, UNSPECIFIED OBESITY TYPE (HCC): Primary | ICD-10-CM

## 2024-06-18 RX ORDER — TIRZEPATIDE 5 MG/.5ML
5 INJECTION, SOLUTION SUBCUTANEOUS WEEKLY
Qty: 2 ML | Refills: 0 | Status: SHIPPED | OUTPATIENT
Start: 2024-06-18

## 2024-06-18 NOTE — TELEPHONE ENCOUNTER
From: Cristina Alvarado  To: Teressa Andrew  Sent: 6/18/2024 12:09 PM CDT  Subject: Zepbound    So...[facility] is out of Zep bound [facility]. You sent the new prescription over a week ago and they finally told me today that they are out and backordered. I called [facility] in [place]. They do not have the 7.5 but the DO have a couple 5MG's left. I sent a request to refill the 5MG with [facility]. She said she cant hold it so is it possible to get the prescription sent over today?   Thanks!!!

## 2024-06-18 NOTE — TELEPHONE ENCOUNTER
7.5 mg was approved 6/12/24 but she cannot locate   Sent 5 mg per her request today to [facility].

## 2024-06-18 NOTE — TELEPHONE ENCOUNTER
Requesting   Requested Prescriptions     Pending Prescriptions Disp Refills    Tirzepatide-Weight Management (ZEPBOUND) 5 MG/0.5ML Subcutaneous Solution Auto-injector 2 mL 0     Sig: Inject 5 mg into the skin once a week.      LOV: 1/16/24  RTC:   Last Relevant Labs:   Filled: 4/26/24 #2ml with 0 refills    No future appointments.

## 2024-06-21 RX ORDER — TIRZEPATIDE 5 MG/.5ML
5 INJECTION, SOLUTION SUBCUTANEOUS WEEKLY
Qty: 2 ML | Refills: 0 | Status: SHIPPED | OUTPATIENT
Start: 2024-06-21

## 2024-07-13 RX ORDER — TIRZEPATIDE 5 MG/.5ML
5 INJECTION, SOLUTION SUBCUTANEOUS WEEKLY
Qty: 2 ML | Refills: 0 | Status: CANCELLED | OUTPATIENT
Start: 2024-07-13

## 2024-07-14 DIAGNOSIS — E66.01 CLASS 2 SEVERE OBESITY WITH SERIOUS COMORBIDITY AND BODY MASS INDEX (BMI) OF 35.0 TO 35.9 IN ADULT, UNSPECIFIED OBESITY TYPE (HCC): ICD-10-CM

## 2024-07-14 DIAGNOSIS — E66.812 CLASS 2 SEVERE OBESITY WITH SERIOUS COMORBIDITY AND BODY MASS INDEX (BMI) OF 35.0 TO 35.9 IN ADULT, UNSPECIFIED OBESITY TYPE (HCC): ICD-10-CM

## 2024-07-15 RX ORDER — TIRZEPATIDE 5 MG/.5ML
5 INJECTION, SOLUTION SUBCUTANEOUS WEEKLY
Qty: 2 ML | Refills: 0 | Status: CANCELLED | OUTPATIENT
Start: 2024-07-15

## 2024-07-15 NOTE — TELEPHONE ENCOUNTER
Outgoing call to patient to schedule for a sooner appt due to appointment request    Patient is scheduled for a sooner appointment     Future Appointments   Date Time Provider Department Center   9/17/2024 12:20 PM Patti Castillo PA-C [id number] [address]     Patient prefers her prescription to be sent to a different Pharmacy this time due to [facility] being [facility]    [facility] [address] [place] [address] - [address] [phone], [phone]

## 2024-07-15 NOTE — TELEPHONE ENCOUNTER
Requesting   Requested Prescriptions     Pending Prescriptions Disp Refills    Tirzepatide-Weight Management (ZEPBOUND) 5 MG/0.5ML Subcutaneous Solution Auto-injector 2 mL 0     Sig: Inject 5 mg into the skin once a week.       LOV: 1/16/24  RTC:   Last Relevant Labs:   Filled: 6/21/24 #2 ml with 0 refills    No future appointments.    [facility] sent for appt

## 2024-07-16 RX ORDER — TIRZEPATIDE 7.5 MG/.5ML
7.5 INJECTION, SOLUTION SUBCUTANEOUS WEEKLY
Qty: 2 ML | Refills: 0 | Status: SHIPPED | OUTPATIENT
Start: 2024-07-16

## 2024-07-16 RX ORDER — TIRZEPATIDE 7.5 MG/.5ML
7.5 INJECTION, SOLUTION SUBCUTANEOUS WEEKLY
Qty: 2 ML | Refills: 0 | Status: SHIPPED | OUTPATIENT
Start: 2024-07-16 | End: 2024-08-07

## 2024-07-16 NOTE — TELEPHONE ENCOUNTER
Requesting   Requested Prescriptions     Pending Prescriptions Disp Refills    Tirzepatide-Weight Management (ZEPBOUND) 7.5 MG/0.5ML Subcutaneous Solution Auto-injector 2 mL 0     Sig: Inject 7.5 mg into the skin once a week for 4 doses.      LOV: 1/16/24  RTC:   Last Relevant Labs:   Filled: 06/12/24 #2ml with 0 refills    Future Appointments   Date Time Provider Department Center   9/17/2024 12:20 PM Teressa Andrew PA-C [id number] [address]

## 2024-07-17 RX ORDER — TIRZEPATIDE 7.5 MG/.5ML
7.5 INJECTION, SOLUTION SUBCUTANEOUS WEEKLY
Qty: 2 ML | Refills: 0 | Status: CANCELLED | OUTPATIENT
Start: 2024-07-17

## 2024-09-10 RX ORDER — TIRZEPATIDE 5 MG/.5ML
5 INJECTION, SOLUTION SUBCUTANEOUS WEEKLY
Refills: 0 | OUTPATIENT
Start: 2024-09-10

## 2024-09-17 ENCOUNTER — TELEMEDICINE (OUTPATIENT)
Facility: CLINIC | Age: 54
End: 2024-09-17
Payer: COMMERCIAL

## 2024-09-17 DIAGNOSIS — E66.01 CLASS 2 SEVERE OBESITY WITH SERIOUS COMORBIDITY AND BODY MASS INDEX (BMI) OF 35.0 TO 35.9 IN ADULT, UNSPECIFIED OBESITY TYPE (HCC): ICD-10-CM

## 2024-09-17 DIAGNOSIS — R73.03 PREDIABETES: ICD-10-CM

## 2024-09-17 DIAGNOSIS — Z51.81 ENCOUNTER FOR THERAPEUTIC DRUG LEVEL MONITORING: Primary | ICD-10-CM

## 2024-09-17 DIAGNOSIS — E78.5 HYPERLIPIDEMIA, UNSPECIFIED HYPERLIPIDEMIA TYPE: ICD-10-CM

## 2024-09-17 PROCEDURE — 99213 OFFICE O/P EST LOW 20 MIN: CPT | Performed by: PHYSICIAN ASSISTANT

## 2024-09-17 RX ORDER — TIRZEPATIDE 5 MG/.5ML
5 INJECTION, SOLUTION SUBCUTANEOUS WEEKLY
Qty: 2 ML | Refills: 2 | Status: SHIPPED | OUTPATIENT
Start: 2024-09-17

## 2024-09-17 NOTE — PROGRESS NOTES
This visit is conducted using Telemedicine with live, interactive video and audio.    Patient has been referred to the [facility] website at [email or web address] to review the yearly Consent to Treat document.    Patient understands and accepts financial responsibility for any deductible, co-insurance and/or co-pays associated with this service.      HISTORY OF PRESENT ILLNESS  Chief Complaint   Patient presents with    Weight Check       Cristina Alvarado is a 54 year old female here for follow up in medical weight loss program.   Last OV 1/16/24  207.9lbs  Pt is compliant on zepbound - was feeling great until the last day of last month  Denies chest pain, shortness of breath, dizziness, blurred vision, headache, paresthesia, nausea/vomiting.   Flew from [place] to [place], had chicken and veggie, roll, fruit, lettuce, went to the hotel took a nap and when she got up and went out to dinner, got really sick  Took the next week dose and it happened again  It was the 7.5mg dose  Sleeping better  Decrease in joint pain  Feels the inflammation leaving her body  Overall doing well with food choices, trying to get more protein    Exercise/Activity: walking, biking  Nutrition: 24 hour food log reviewed, eating regular meals, +protein  Stress: 7/10  Sleep: 5 hours/night         Wt Readings from Last 6 Encounters:   01/16/24 242 lb (109.8 kg)   06/02/23 239 lb (108.4 kg)            Breakfast Lunch Dinner Snacks Fluids              REVIEW OF SYSTEMS  GENERAL HEALTH: feels well otherwise, denied any fevers chills or night sweats   RESPIRATORY: denies shortness of breath   CARDIOVASCULAR: denies chest pain  GI: denies abdominal pain    EXAM  There were no vitals taken for this visit.  GENERAL: well developed, well nourished,in no apparent distress, A/O x3  SKIN: no rashes,no suspicious lesions on visible skin  HEENT: atraumatic, normocephalic  LUNGS: no increased effort or work with breathing   NEURO: speaking fluently and in clear  sentences    Lab Results   Component Value Date    WBC 7.1 06/05/2023    RBC 4.46 06/05/2023    HGB 13.3 06/05/2023    HCT 40.4 06/05/2023    MCV 90.6 06/05/2023    MCH 29.8 06/05/2023    MCHC 32.9 06/05/2023    RDW 12.8 06/05/2023    .0 06/05/2023     No results found for: \"GLU\", \"BUN\", \"BUNCREA\", \"CREATSERUM\", \"ANIONGAP\", \"GFR\", \"GFRNAA\", \"GFRAA\", \"CA\", \"OSMOCALC\", \"ALKPHO\", \"AST\", \"ALT\", \"ALKPHOS\", \"BILT\", \"TP\", \"ALB\", \"GLOBULIN\", \"AGRATIO\", \"NA\", \"K\", \"CL\", \"CO2\"  No results found for: \"EAG\", \"A1C\"  No results found for: \"CHOLEST\", \"TRIG\", \"HDL\", \"LDL\", \"VLDL\", \"TCHDLRATIO\", \"NONHDLC\", \"CHOLHDLRATIO\", \"CALCNONHDL\"  Lab Results   Component Value Date    TSH 1.500 06/05/2023     No results found for: \"B12\", \"VITB12\"  Lab Results   Component Value Date    VITD 20.5 (L) 06/05/2023       Current Outpatient Medications on File Prior to Visit   Medication Sig Dispense Refill    Tirzepatide-Weight Management (ZEPBOUND) 7.5 MG/0.5ML Subcutaneous Solution Auto-injector Inject 7.5 mg into the skin once a week. 2 mL 0    Tirzepatide-Weight Management (ZEPBOUND) 5 MG/0.5ML Subcutaneous Solution Auto-injector Inject 5 mg into the skin once a week. 2 mL 0    Tirzepatide-Weight Management (ZEPBOUND) 5 MG/0.5ML Subcutaneous Solution Auto-injector Inject 5 mg into the skin once a week. 2 mL 0    methylPREDNISolone (MEDROL) 4 MG Oral Tablet Therapy Pack As directed. 1 each 0    lidocaine (LIDODERM) 5 % External Patch Place 1 patch onto the skin daily. On the right arm. 30 patch 3    ondansetron 4 MG Oral Tablet Dispersible Take 1 tablet (4 mg total) by mouth every 8 (eight) hours as needed for Nausea. 15 tablet 0    estradiol 0.5 MG Oral Tab Take 1 tablet (0.5 mg total) by mouth daily.      progesterone 200 MG Oral Cap TAKE 1 CAPSULE BY MOUTH EVERY EVENING FOR 12 DAYS SEQUENTIALLY PER 28 DAY CYCLE      Vitamin D, Cholecalciferol, 25 MCG (1000 UT) Oral Tab Take 1,000 Units by mouth daily.      Magnesium Citrate 100 MG Oral  Tab Take 100 mg by mouth daily.       No current facility-administered medications on file prior to visit.       ASSESSMENT  Analyzed weight data:       Diagnoses and all orders for this visit:    Encounter for therapeutic drug level monitoring  -     Tirzepatide-Weight Management (ZEPBOUND) 5 MG/0.5ML Subcutaneous Solution Auto-injector; Inject 5 mg into the skin once a week.    Class 2 severe obesity with serious comorbidity and body mass index (BMI) of 35.0 to 35.9 in adult, unspecified obesity type (HCC)  -     Tirzepatide-Weight Management (ZEPBOUND) 5 MG/0.5ML Subcutaneous Solution Auto-injector; Inject 5 mg into the skin once a week.    Prediabetes  -     Tirzepatide-Weight Management (ZEPBOUND) 5 MG/0.5ML Subcutaneous Solution Auto-injector; Inject 5 mg into the skin once a week.    Hyperlipidemia, unspecified hyperlipidemia type  -     Tirzepatide-Weight Management (ZEPBOUND) 5 MG/0.5ML Subcutaneous Solution Auto-injector; Inject 5 mg into the skin once a week.        PLAN  Initial Weight: 242lbs  Initial Weight Date: 1/16/24  Today's Weight: 208lbs  5% Goal: 12.1lbs  10% Goal: 24.2lbs  Total Weight Loss: Net loss 34lbs    Will continue and decrease zepbound - advised side effects and adverse effects of medication   Prediabetes - continue current medications, low carb diet  HLD - lifestyle changes  Consistency with logging foods - protein and produce  Focus on strength/resistance training  Nutrition: low carb diet/ recommended to eat breakfast daily/ regular protein intake  Medication use and side effects reviewed with patient.  Medication contraindications: EKG prior to stimulant  Follow up with dietitian and psychologist as recommended.  Discussed the role of sleep and stress in weight management.  Counseled on comprehensive weight loss plan including attention to nutrition, exercise and behavior/stress management for success. See patient instruction below for more details.  Discussed strategies to  overcome barriers to successful weight loss and weight maintenance  FITTE: ACSM recommendations (150-300 minutes/ week in active weight loss)   Weight Loss consent to treat reviewed and signed     There are no Patient Instructions on file for this visit.    No follow-ups on file.    Patient verbalizes understanding.    Teressa Andrew PA-C  9/17/2024    Please note that the following visit was completed using two-way, real-time interactive audio and video communication.  This has been done in good clarke to provide continuity of care in the best interest of the provider-patient relationship, due to the ongoing public health crisis/national emergency and because of restrictions of visitation.  There are limitations of this visit as no physical exam could be performed.  Every conscious effort was taken to allow for sufficient and adequate time.  This billing was spent on reviewing labs, medications, radiology tests and decision making.  Appropriate medical decision-making and tests are ordered as detailed in the plan of care above    Teressa Andrew PA-C

## 2024-09-19 ENCOUNTER — PATIENT MESSAGE (OUTPATIENT)
Facility: CLINIC | Age: 54
End: 2024-09-19

## 2024-09-20 NOTE — TELEPHONE ENCOUNTER
From: Cristina Alvarado  To: Teressa Andrew  Sent: 9/19/2024 10:46 PM CDT  Subject: Cortisone shot & Zepbound    HI,  I went to the Orthopedic Doctor today and he gave me a cortisone shot in my shoulder.   I am due for my weekly Zepbound shot.  Since Cortisone raises your bloodsugar I didnt know if I should take the Zepbound the same day as the cortisone shot.  40 lbs ago I was prediabetic.  What do you think?  Am i ok to take the Zepbound?  THanks

## 2024-10-14 ENCOUNTER — NURSE TRIAGE (OUTPATIENT)
Dept: INTERNAL MEDICINE CLINIC | Facility: CLINIC | Age: 54
End: 2024-10-14

## 2024-10-14 NOTE — TELEPHONE ENCOUNTER
Patient called stating she went to IC today with some left sided pain that started 5 days ago. They checked her urine and noted blood in urine. She has a history of a right ovarian cyst. Pain is intermittent, stabbing. The provider thought she might have a kidney stone and ordered a stat CT \"stone protocol\" to be done at [facility] in [place]. Patient wanted to confirm Dr Robison agreed with recommendation for stat CT. She states she does not want to get any unnecessary radiation.

## 2024-10-14 NOTE — TELEPHONE ENCOUNTER
Per Dr Green, \"Yes why not, ok for the order\".    Advised patient of above. Asked if she could have [facility] fax copy of results to us. Fax number provided.

## 2024-10-18 ENCOUNTER — MED REC SCAN ONLY (OUTPATIENT)
Dept: FAMILY MEDICINE CLINIC | Facility: CLINIC | Age: 54
End: 2024-10-18

## 2024-10-22 ENCOUNTER — OFFICE VISIT (OUTPATIENT)
Dept: FAMILY MEDICINE CLINIC | Facility: CLINIC | Age: 54
End: 2024-10-22
Payer: COMMERCIAL

## 2024-10-22 VITALS
HEIGHT: 69 IN | DIASTOLIC BLOOD PRESSURE: 84 MMHG | BODY MASS INDEX: 30.36 KG/M2 | WEIGHT: 205 LBS | SYSTOLIC BLOOD PRESSURE: 120 MMHG | HEART RATE: 80 BPM | RESPIRATION RATE: 18 BRPM | OXYGEN SATURATION: 98 %

## 2024-10-22 DIAGNOSIS — Z00.00 LABORATORY EXAMINATION ORDERED AS PART OF A ROUTINE GENERAL MEDICAL EXAMINATION: ICD-10-CM

## 2024-10-22 DIAGNOSIS — Z00.00 ROUTINE GENERAL MEDICAL EXAMINATION AT A HEALTH CARE FACILITY: Primary | ICD-10-CM

## 2024-10-22 DIAGNOSIS — Z12.31 ENCOUNTER FOR SCREENING MAMMOGRAM FOR MALIGNANT NEOPLASM OF BREAST: ICD-10-CM

## 2024-10-22 PROCEDURE — 3008F BODY MASS INDEX DOCD: CPT | Performed by: FAMILY MEDICINE

## 2024-10-22 PROCEDURE — 3079F DIAST BP 80-89 MM HG: CPT | Performed by: FAMILY MEDICINE

## 2024-10-22 PROCEDURE — 99396 PREV VISIT EST AGE 40-64: CPT | Performed by: FAMILY MEDICINE

## 2024-10-22 PROCEDURE — 3074F SYST BP LT 130 MM HG: CPT | Performed by: FAMILY MEDICINE

## 2024-10-22 NOTE — PROGRESS NOTES
Shania Lyons is a 54 year old female who presents for a complete physical exam, no gyn.  HPI:     Chief Complaint   Patient presents with    Physical    Follow - Up     [facility] care-10/14/2024       Patient feels well, dental visit up to date, no hearing problem.  Pt lost weight, exercise, diet. Doing great.      Wt Readings from Last 3 Encounters:   10/22/24 205 lb (93 kg)   01/16/24 242 lb (109.8 kg)   06/02/23 239 lb (108.4 kg)      BP Readings from Last 3 Encounters:   10/22/24 120/84   06/02/23 120/84     No LMP recorded. Patient is perimenopausal.         Current Outpatient Medications   Medication Sig Dispense Refill    Tirzepatide-Weight Management (ZEPBOUND) 5 MG/0.5ML Subcutaneous Solution Auto-injector Inject 5 mg into the skin once a week. 2 mL 0    ondansetron 4 MG Oral Tablet Dispersible Take 1 tablet (4 mg total) by mouth every 8 (eight) hours as needed for Nausea. 15 tablet 0    estradiol 0.5 MG Oral Tab Take 1 tablet (0.5 mg total) by mouth daily.      progesterone 200 MG Oral Cap TAKE 1 CAPSULE BY MOUTH EVERY EVENING FOR 12 DAYS SEQUENTIALLY PER 28 DAY CYCLE      Vitamin D, Cholecalciferol, 25 MCG (1000 UT) Oral Tab Take 1,000 Units by mouth daily.      Magnesium Citrate 100 MG Oral Tab Take 100 mg by mouth daily.      Tirzepatide-Weight Management (ZEPBOUND) 5 MG/0.5ML Subcutaneous Solution Auto-injector Inject 5 mg into the skin once a week. (Patient not taking: Reported on 10/22/2024) 2 mL 2    Tirzepatide-Weight Management (ZEPBOUND) 7.5 MG/0.5ML Subcutaneous Solution Auto-injector Inject 7.5 mg into the skin once a week. (Patient not taking: Reported on 10/22/2024) 2 mL 0    Tirzepatide-Weight Management (ZEPBOUND) 5 MG/0.5ML Subcutaneous Solution Auto-injector Inject 5 mg into the skin once a week. (Patient not taking: Reported on 10/22/2024) 2 mL 0    methylPREDNISolone (MEDROL) 4 MG Oral Tablet Therapy Pack As directed. (Patient not taking: Reported on 10/22/2024) 1 each 0    lidocaine  (LIDODERM) 5 % External Patch Place 1 patch onto the skin daily. On the right arm. (Patient not taking: Reported on 10/22/2024) 30 patch 3      History reviewed. No pertinent past medical history.   Past Surgical History:   Procedure Laterality Date            History reviewed. No pertinent family history.   Social History     Socioeconomic History    Marital status:    Tobacco Use    Smoking status: Never    Smokeless tobacco: Never   Substance and Sexual Activity    Alcohol use: Never    Drug use: Never   Other Topics Concern    Caffeine Concern Yes    Exercise Yes    Seat Belt Yes    Stress Concern No    Weight Concern No        REVIEW OF SYSTEMS:   GENERAL HEALTH: feels well, no fatigue.  SKIN: denies any unusual skin lesions or rashes  EYES: no visual complaints or deficits  HEENT: denies nasal congestion, sinus pain or sore throat; hearing loss negative   RESPIRATORY: denies shortness of breath, wheezing or cough   CARDIOVASCULAR: denies chest pain, SOB, edema,orthopnea, no palpitations   GI: denies nausea, vomiting, constipation, diarrhea; no rectal bleeding; no heartburn  GENITAL/: no dysuria, urgency or frequency  MUSCULOSKELETAL: no joint complaints upper or lower extremities  NEURO: no sensory or motor complaint  HEMATOLOGY: denies hx anemia; denies bruising or excessive bleeding  ENDOCRINE: denies excessive thirst or urination; denies unexpected wt gain or wt loss  ALLERGY/IMM.: denies food or seasonal allergies  PSYCH: no symptoms of depression or anxiety      EXAM:   /84   Pulse 80   Resp 18   Ht 5' 9\" (1.753 m)   Wt 205 lb (93 kg)   SpO2 98%   BMI 30.27 kg/m²      General: WD/WN in no acute distress.   HEENT: PERRLA and EOMI.  OP moist no lesions.  Neck is supple, with no cervical LAD or thyroid abnormalities. No carotid bruits.    Lungs: are clear to auscultation bilaterally, with no wheeze, rhonchi, or rales.   Heart: is RRR.  S1, S2, with no murmurs,clicks,  gallops  Breasts bilateral: normal on inspection, normal on palpation, no lumps, no nipple abnormalities.  Abdomen: is soft,NBS, NT/ND with no HSM.  No rebound or guarding. No CVA tenderness, no hernias.  Neuro: Cranial nerves II-XII normal,no focal abnormalities, and reflexes coordination and gait normal and symmetric.Sensation intact.  Extremities: are symmetric with no cyanosis, clubbing, or edema.  MS: Normal muscles tones, no joints abnormalities.  SKIN: Normal color, turgor, no lesions, rashes or wounds.  PSYCH: Normal affect and mood.          ASSESSMENT AND PLAN:     Shania Lyons is a 54 year old female who presents for a complete physical exam.   Pt's was recommended low fat diet and aerobic exercise 30 minutes three times weekly.   Health maintenance.   Pt lost weight, doing great.      Laboratory examination ordered as part of a routine general medical examination  -     CBC With Differential With Platelet; Future  -     Comp Metabolic Panel (14); Future  -     Lipid Panel; Future  -     Vitamin D; Future  -     TSH W Reflex To Free T4; Future  -     Hemoglobin A1C; Future    Encounter for screening mammogram for malignant neoplasm of breast  -     [facility] ELE 2D+3D SCREENING BILAT (CPT=77067/23436); Future       The patient indicates understanding of these issues and agrees to the plan.  The patient is asked to return for CPX in 1 year.

## 2024-12-09 RX ORDER — TIRZEPATIDE 7.5 MG/.5ML
7.5 INJECTION, SOLUTION SUBCUTANEOUS WEEKLY
Qty: 2 ML | Refills: 0 | OUTPATIENT
Start: 2024-12-09

## 2024-12-09 NOTE — TELEPHONE ENCOUNTER
Requesting   Requested Prescriptions     Pending Prescriptions Disp Refills    Tirzepatide-Weight Management (ZEPBOUND) 7.5 MG/0.5ML Subcutaneous Solution Auto-injector 2 mL 0     Sig: Inject 7.5 mg into the skin once a week.      LOV: 09/17/24 (tele)  RTC: 3-6mo  Last Relevant Labs:   Filled: 09/17/24 #2ml with 0 refills    No future appointments.

## 2024-12-15 ENCOUNTER — PATIENT MESSAGE (OUTPATIENT)
Facility: CLINIC | Age: 54
End: 2024-12-15

## 2024-12-18 DIAGNOSIS — R73.03 PREDIABETES: ICD-10-CM

## 2024-12-18 DIAGNOSIS — E78.5 HYPERLIPIDEMIA, UNSPECIFIED HYPERLIPIDEMIA TYPE: ICD-10-CM

## 2024-12-18 DIAGNOSIS — E66.812 CLASS 2 SEVERE OBESITY WITH SERIOUS COMORBIDITY AND BODY MASS INDEX (BMI) OF 35.0 TO 35.9 IN ADULT, UNSPECIFIED OBESITY TYPE (HCC): ICD-10-CM

## 2024-12-18 DIAGNOSIS — Z51.81 ENCOUNTER FOR THERAPEUTIC DRUG LEVEL MONITORING: ICD-10-CM

## 2024-12-18 DIAGNOSIS — E66.01 CLASS 2 SEVERE OBESITY WITH SERIOUS COMORBIDITY AND BODY MASS INDEX (BMI) OF 35.0 TO 35.9 IN ADULT, UNSPECIFIED OBESITY TYPE (HCC): ICD-10-CM

## 2024-12-18 RX ORDER — TIRZEPATIDE 5 MG/.5ML
5 INJECTION, SOLUTION SUBCUTANEOUS WEEKLY
Qty: 2 ML | Refills: 2 | Status: CANCELLED | OUTPATIENT
Start: 2024-12-18

## 2025-02-07 ENCOUNTER — OFFICE VISIT (OUTPATIENT)
Facility: CLINIC | Age: 55
End: 2025-02-07
Payer: COMMERCIAL

## 2025-02-07 VITALS
RESPIRATION RATE: 18 BRPM | HEIGHT: 69 IN | DIASTOLIC BLOOD PRESSURE: 74 MMHG | OXYGEN SATURATION: 94 % | HEART RATE: 70 BPM | BODY MASS INDEX: 31.7 KG/M2 | WEIGHT: 214 LBS | SYSTOLIC BLOOD PRESSURE: 124 MMHG

## 2025-02-07 DIAGNOSIS — R63.5 WEIGHT GAIN: ICD-10-CM

## 2025-02-07 DIAGNOSIS — R73.03 PREDIABETES: ICD-10-CM

## 2025-02-07 DIAGNOSIS — Z51.81 ENCOUNTER FOR THERAPEUTIC DRUG LEVEL MONITORING: Primary | ICD-10-CM

## 2025-02-07 DIAGNOSIS — E78.5 HYPERLIPIDEMIA, UNSPECIFIED HYPERLIPIDEMIA TYPE: ICD-10-CM

## 2025-02-07 DIAGNOSIS — E66.811 CLASS 1 OBESITY WITH SERIOUS COMORBIDITY AND BODY MASS INDEX (BMI) OF 31.0 TO 31.9 IN ADULT, UNSPECIFIED OBESITY TYPE: ICD-10-CM

## 2025-02-07 RX ORDER — TIRZEPATIDE 7.5 MG/.5ML
7.5 INJECTION, SOLUTION SUBCUTANEOUS WEEKLY
Qty: 2 ML | Refills: 0 | Status: SHIPPED | OUTPATIENT
Start: 2025-02-07

## 2025-02-07 NOTE — PROGRESS NOTES
HISTORY OF PRESENT ILLNESS  Chief Complaint   Patient presents with    Weight Check     +6lbs       Shania Lyons is a 54 year old female here for follow up in medical weight loss program.   +6lbs  Compliant on zepbound  Denies chest pain, shortness of breath, dizziness, blurred vision, headache, paresthesia, nausea/vomiting.   Got down to 203lbs  Has been off the zepbound for a few months, has been back on the 5mg for 2 weeks  Prioritizing protein, veggies, fruits  Doesn't feel appetite suppression like before  Struggled more at holidays  PCP put in labs for her to complete    Exercise/Activity: 3-4 days a week walking, trying to get some weight training in  Nutrition: 24 hour food log reviewed, eating regular meals, +protein  Stress: 7/10  Sleep: 5 hours/night     [facility] Follow Up    General Information  Success Moment: Was able to successfully lose a meaningful amour of weight   for the first time in my life  Challenging Moment: Was unable to refill medication and gained 12 pounds  Nutrition Recall  Breakfast: Oikos triple 0 15g protein yogurt with blueberries, scoop of   flaxseeds and mayur Lunch: Protein shake and an orange   Dinner: Chicken tortilla soup and 1 steak taco Snacks: 4 GS cookies   Fluids: Water & coffee Dining Out: 6   Exercise   Patient stated exercises # days/week: 3  Patient stated perceived level of   exertion: 3 Anaerobic Days: 3   Aerobic Days: 3   Patient stated average level of stress: 7  Sleep   Patient stated # hours uninterrupted sleep: 5   Patient stated feels   restful: No      Cause of disruption of sleep: Menopause   Goals: Lose the weight i gained, more energy              Wt Readings from Last 6 Encounters:   02/07/25 214 lb (97.1 kg)   10/22/24 205 lb (93 kg)   01/16/24 242 lb (109.8 kg)   06/02/23 239 lb (108.4 kg)            Breakfast Lunch Dinner Snacks Fluids   Reviewed           REVIEW OF SYSTEMS  GENERAL HEALTH: feels well otherwise, denied any fevers chills or night sweats    RESPIRATORY: denies shortness of breath   CARDIOVASCULAR: denies chest pain  GI: denies abdominal pain    EXAM  /74   Pulse 70   Resp 18   Ht 5' 9\" (1.753 m)   Wt 214 lb (97.1 kg)   SpO2 94%   BMI 31.60 kg/m²   GENERAL: well developed, well nourished,in no apparent distress, A/O x3  SKIN: no rashes,no suspicious lesions  HEENT: atraumatic, normocephalic, OP-clear, PERRL  NECK: supple,no adenopathy  LUNGS: clear to auscultation bilaterally   CARDIO: RRR without murmur  GI: good BS's,NT/ND, no masses or HSM  EXTREMITIES: no cyanosis, no clubbing, no edema    Lab Results   Component Value Date    WBC 7.1 06/05/2023    RBC 4.46 06/05/2023    HGB 13.3 06/05/2023    HCT 40.4 06/05/2023    MCV 90.6 06/05/2023    MCH 29.8 06/05/2023    MCHC 32.9 06/05/2023    RDW 12.8 06/05/2023    .0 06/05/2023     No results found for: \"GLU\", \"BUN\", \"BUNCREA\", \"CREATSERUM\", \"ANIONGAP\", \"GFR\", \"GFRNAA\", \"GFRAA\", \"CA\", \"OSMOCALC\", \"ALKPHO\", \"AST\", \"ALT\", \"ALKPHOS\", \"BILT\", \"TP\", \"ALB\", \"GLOBULIN\", \"AGRATIO\", \"NA\", \"K\", \"CL\", \"CO2\"  No results found for: \"EAG\", \"A1C\"  No results found for: \"CHOLEST\", \"TRIG\", \"HDL\", \"LDL\", \"VLDL\", \"TCHDLRATIO\", \"NONHDLC\", \"CHOLHDLRATIO\", \"CALCNONHDL\"  Lab Results   Component Value Date    TSH 1.500 06/05/2023     No results found for: \"B12\", \"VITB12\"  Lab Results   Component Value Date    VITD 20.5 (L) 06/05/2023       Medications Ordered Prior to Encounter[1]    ASSESSMENT  Analyzed weight data:       Diagnoses and all orders for this visit:    Encounter for therapeutic drug level monitoring  -     Tirzepatide-Weight Management (ZEPBOUND) 7.5 MG/0.5ML Subcutaneous Solution Auto-injector; Inject 7.5 mg into the skin once a week.    Class 1 obesity with serious comorbidity and body mass index (BMI) of 31.0 to 31.9 in adult, unspecified obesity type  -     Tirzepatide-Weight Management (ZEPBOUND) 7.5 MG/0.5ML Subcutaneous Solution Auto-injector; Inject 7.5 mg into the skin once a  week.    Prediabetes  -     Tirzepatide-Weight Management (ZEPBOUND) 7.5 MG/0.5ML Subcutaneous Solution Auto-injector; Inject 7.5 mg into the skin once a week.    Hyperlipidemia, unspecified hyperlipidemia type  -     Tirzepatide-Weight Management (ZEPBOUND) 7.5 MG/0.5ML Subcutaneous Solution Auto-injector; Inject 7.5 mg into the skin once a week.    Weight gain        PLAN  Initial Weight: 242lbs  Initial Weight Date: 1/16/24  Today's Weight: 214lbs  5% Goal: 12.1lbs  10% Goal: 24.2lbs  Total Weight Loss: Net loss 28lbs    Will continue and increase zepbound - advised side effects and adverse effects of medication   Prediabetes - continue current medication, low carb diet  HLD - lifestyle changes  Encouraged pt to get lab work done that PCP ordered  Consistency with logging foods - protein and produce  Incorporate strength/resistance training  Nutrition: low carb diet/ recommended to eat breakfast daily/ regular protein intake  Medication use and side effects reviewed with patient.  Medication contraindications: EKG prior to stimulant  Follow up with dietitian and psychologist as recommended.  Discussed the role of sleep and stress in weight management.  Counseled on comprehensive weight loss plan including attention to nutrition, exercise and behavior/stress management for success. See patient instruction below for more details.  Discussed strategies to overcome barriers to successful weight loss and weight maintenance  FITTE: ACSM recommendations (150-300 minutes/ week in active weight loss)   Weight Loss consent to treat reviewed and signed       NOTE TO PATIENT: The 21st Century Cures Act makes clinical notes like these available to patients in the interest of transparency. Clinical notes are medical documents used by physicians and care providers to communicate with each other. These documents include medical language and terminology, abbreviations, and treatment information that may sound technical and at  times possibly unfamiliar. In addition, at times, the verbiage may appear blunt or direct. These documents are one tool providers use to communicate relevant information and clinical opinions of the care providers in a way that allows common understanding of the clinical context.     There are no Patient Instructions on file for this visit.    No follow-ups on file.    Patient verbalizes understanding.    Patti Castillo PA-C  2/7/2025           [1]   Current Outpatient Medications on File Prior to Visit   Medication Sig Dispense Refill    Tirzepatide-Weight Management (ZEPBOUND) 5 MG/0.5ML Subcutaneous Solution Auto-injector Inject 5 mg into the skin once a week. (Patient not taking: Reported on 2/7/2025) 2 mL 2    Tirzepatide-Weight Management (ZEPBOUND) 7.5 MG/0.5ML Subcutaneous Solution Auto-injector Inject 7.5 mg into the skin once a week. (Patient not taking: Reported on 10/22/2024) 2 mL 0    Tirzepatide-Weight Management (ZEPBOUND) 5 MG/0.5ML Subcutaneous Solution Auto-injector Inject 5 mg into the skin once a week. 2 mL 0    Tirzepatide-Weight Management (ZEPBOUND) 5 MG/0.5ML Subcutaneous Solution Auto-injector Inject 5 mg into the skin once a week. (Patient not taking: Reported on 10/22/2024) 2 mL 0    methylPREDNISolone (MEDROL) 4 MG Oral Tablet Therapy Pack As directed. (Patient not taking: Reported on 10/22/2024) 1 each 0    lidocaine (LIDODERM) 5 % External Patch Place 1 patch onto the skin daily. On the right arm. (Patient not taking: Reported on 10/22/2024) 30 patch 3    ondansetron 4 MG Oral Tablet Dispersible Take 1 tablet (4 mg total) by mouth every 8 (eight) hours as needed for Nausea. (Patient not taking: Reported on 2/7/2025) 15 tablet 0    estradiol 0.5 MG Oral Tab Take 1 tablet (0.5 mg total) by mouth daily.      progesterone 200 MG Oral Cap TAKE 1 CAPSULE BY MOUTH EVERY EVENING FOR 12 DAYS SEQUENTIALLY PER 28 DAY CYCLE      Vitamin D, Cholecalciferol, 25 MCG (1000 UT) Oral Tab Take 1,000  Units by mouth daily.      Magnesium Citrate 100 MG Oral Tab Take 100 mg by mouth daily.       No current facility-administered medications on file prior to visit.

## 2025-02-11 ENCOUNTER — PATIENT MESSAGE (OUTPATIENT)
Facility: CLINIC | Age: 55
End: 2025-02-11

## 2025-02-11 DIAGNOSIS — E78.5 HYPERLIPIDEMIA, UNSPECIFIED HYPERLIPIDEMIA TYPE: ICD-10-CM

## 2025-02-11 DIAGNOSIS — E66.811 CLASS 1 OBESITY WITH SERIOUS COMORBIDITY AND BODY MASS INDEX (BMI) OF 31.0 TO 31.9 IN ADULT, UNSPECIFIED OBESITY TYPE: ICD-10-CM

## 2025-02-11 DIAGNOSIS — Z51.81 ENCOUNTER FOR THERAPEUTIC DRUG LEVEL MONITORING: ICD-10-CM

## 2025-02-11 DIAGNOSIS — R73.03 PREDIABETES: ICD-10-CM

## 2025-02-11 RX ORDER — TIRZEPATIDE 7.5 MG/.5ML
7.5 INJECTION, SOLUTION SUBCUTANEOUS WEEKLY
Qty: 2 ML | Refills: 0 | Status: SHIPPED | OUTPATIENT
Start: 2025-02-11

## 2025-03-12 DIAGNOSIS — E78.5 HYPERLIPIDEMIA, UNSPECIFIED HYPERLIPIDEMIA TYPE: ICD-10-CM

## 2025-03-12 DIAGNOSIS — R73.03 PREDIABETES: ICD-10-CM

## 2025-03-12 DIAGNOSIS — E66.811 CLASS 1 OBESITY WITH SERIOUS COMORBIDITY AND BODY MASS INDEX (BMI) OF 31.0 TO 31.9 IN ADULT, UNSPECIFIED OBESITY TYPE: ICD-10-CM

## 2025-03-12 DIAGNOSIS — Z51.81 ENCOUNTER FOR THERAPEUTIC DRUG LEVEL MONITORING: ICD-10-CM

## 2025-03-13 DIAGNOSIS — E78.5 HYPERLIPIDEMIA, UNSPECIFIED HYPERLIPIDEMIA TYPE: ICD-10-CM

## 2025-03-13 DIAGNOSIS — Z51.81 ENCOUNTER FOR THERAPEUTIC DRUG LEVEL MONITORING: ICD-10-CM

## 2025-03-13 DIAGNOSIS — R73.03 PREDIABETES: ICD-10-CM

## 2025-03-13 DIAGNOSIS — E66.811 CLASS 1 OBESITY WITH SERIOUS COMORBIDITY AND BODY MASS INDEX (BMI) OF 31.0 TO 31.9 IN ADULT, UNSPECIFIED OBESITY TYPE: ICD-10-CM

## 2025-03-13 RX ORDER — TIRZEPATIDE 7.5 MG/.5ML
7.5 INJECTION, SOLUTION SUBCUTANEOUS WEEKLY
Qty: 2 ML | Refills: 0 | Status: CANCELLED | OUTPATIENT
Start: 2025-03-13

## 2025-03-13 RX ORDER — TIRZEPATIDE 7.5 MG/.5ML
INJECTION, SOLUTION SUBCUTANEOUS
Qty: 2 ML | Refills: 0 | OUTPATIENT
Start: 2025-03-13

## 2025-03-17 NOTE — TELEPHONE ENCOUNTER
Requesting   Requested Prescriptions     Pending Prescriptions Disp Refills    Tirzepatide-Weight Management (ZEPBOUND) 7.5 MG/0.5ML Subcutaneous Solution Auto-injector 2 mL 0     Sig: Inject 7.5 mg into the skin once a week.      LOV: 02/07/25  RTC: 3-6mo  Last Relevant Labs:   Filled: 2/11/25 #2ml with 0 refills    Future Appointments   Date Time Provider Department Center   5/6/2025 10:00 AM Patti Castillo PA-C [facility] [address]

## 2025-03-20 RX ORDER — TIRZEPATIDE 10 MG/.5ML
10 INJECTION, SOLUTION SUBCUTANEOUS WEEKLY
Qty: 2 ML | Refills: 0 | Status: SHIPPED | OUTPATIENT
Start: 2025-03-20 | End: 2025-04-11

## 2025-03-27 NOTE — TELEPHONE ENCOUNTER
Requesting   Requested Prescriptions     Pending Prescriptions Disp Refills    ondansetron 4 MG Oral Tablet Dispersible 15 tablet 0     Sig: Take 1 tablet (4 mg total) by mouth every 8 (eight) hours as needed for Nausea.      LOV: 02/07/25  RTC:   Last Relevant Labs:   Filled: 01/16/24 #15 with 0 refills    Future Appointments   Date Time Provider Department Center   5/6/2025 10:00 AM Patti Castillo PA-C [facility] [address]

## 2025-03-28 RX ORDER — ONDANSETRON 4 MG/1
4 TABLET, ORALLY DISINTEGRATING ORAL EVERY 8 HOURS PRN
Qty: 15 TABLET | Refills: 0 | Status: SHIPPED | OUTPATIENT
Start: 2025-03-28

## 2025-04-03 ENCOUNTER — LAB ENCOUNTER (OUTPATIENT)
Dept: LAB | Age: 55
End: 2025-04-03
Attending: FAMILY MEDICINE
Payer: COMMERCIAL

## 2025-04-03 DIAGNOSIS — L65.9 HAIR LOSS: ICD-10-CM

## 2025-04-03 DIAGNOSIS — Z00.00 LABORATORY EXAMINATION ORDERED AS PART OF A ROUTINE GENERAL MEDICAL EXAMINATION: ICD-10-CM

## 2025-04-03 LAB
ALBUMIN SERPL-MCNC: 5.1 G/DL (ref 3.2–4.8)
ALBUMIN/GLOB SERPL: 1.9 {RATIO} (ref 1–2)
ALP LIVER SERPL-CCNC: 73 U/L
ALT SERPL-CCNC: 11 U/L
ANION GAP SERPL CALC-SCNC: 9 MMOL/L (ref 0–18)
AST SERPL-CCNC: 17 U/L (ref ?–34)
BASOPHILS # BLD AUTO: 0.05 X10(3) UL (ref 0–0.2)
BASOPHILS NFR BLD AUTO: 0.7 %
BILIRUB SERPL-MCNC: 0.5 MG/DL (ref 0.3–1.2)
BUN BLD-MCNC: 15 MG/DL (ref 9–23)
CALCIUM BLD-MCNC: 10.1 MG/DL (ref 8.7–10.6)
CHLORIDE SERPL-SCNC: 106 MMOL/L (ref 98–112)
CHOLEST SERPL-MCNC: 233 MG/DL (ref ?–200)
CO2 SERPL-SCNC: 27 MMOL/L (ref 21–32)
CREAT BLD-MCNC: 0.98 MG/DL
EGFRCR SERPLBLD CKD-EPI 2021: 68 ML/MIN/1.73M2 (ref 60–?)
EOSINOPHIL # BLD AUTO: 0.07 X10(3) UL (ref 0–0.7)
EOSINOPHIL NFR BLD AUTO: 1 %
ERYTHROCYTE [DISTWIDTH] IN BLOOD BY AUTOMATED COUNT: 12.6 %
ERYTHROCYTE [SEDIMENTATION RATE] IN BLOOD: 24 MM/HR
EST. AVERAGE GLUCOSE BLD GHB EST-MCNC: 114 MG/DL (ref 68–126)
FASTING PATIENT LIPID ANSWER: YES
FASTING STATUS PATIENT QL REPORTED: YES
FSH SERPL-ACNC: 74.6 MIU/ML
GLOBULIN PLAS-MCNC: 2.7 G/DL (ref 2–3.5)
GLUCOSE BLD-MCNC: 95 MG/DL (ref 70–99)
HBA1C MFR BLD: 5.6 % (ref ?–5.7)
HCT VFR BLD AUTO: 43.1 %
HDLC SERPL-MCNC: 63 MG/DL (ref 40–59)
HGB BLD-MCNC: 14.2 G/DL
IMM GRANULOCYTES # BLD AUTO: 0.01 X10(3) UL (ref 0–1)
IMM GRANULOCYTES NFR BLD: 0.1 %
IRON SATN MFR SERPL: 24 %
IRON SERPL-MCNC: 79 UG/DL
LDLC SERPL CALC-MCNC: 154 MG/DL (ref ?–100)
LH SERPL-ACNC: 42.3 MIU/ML
LYMPHOCYTES # BLD AUTO: 2.47 X10(3) UL (ref 1–4)
LYMPHOCYTES NFR BLD AUTO: 36.4 %
MCH RBC QN AUTO: 29.5 PG (ref 26–34)
MCHC RBC AUTO-ENTMCNC: 32.9 G/DL (ref 31–37)
MCV RBC AUTO: 89.6 FL
MONOCYTES # BLD AUTO: 0.38 X10(3) UL (ref 0.1–1)
MONOCYTES NFR BLD AUTO: 5.6 %
NEUTROPHILS # BLD AUTO: 3.8 X10 (3) UL (ref 1.5–7.7)
NEUTROPHILS # BLD AUTO: 3.8 X10(3) UL (ref 1.5–7.7)
NEUTROPHILS NFR BLD AUTO: 56.2 %
NONHDLC SERPL-MCNC: 170 MG/DL (ref ?–130)
OSMOLALITY SERPL CALC.SUM OF ELEC: 295 MOSM/KG (ref 275–295)
PLATELET # BLD AUTO: 299 10(3)UL (ref 150–450)
POTASSIUM SERPL-SCNC: 4.3 MMOL/L (ref 3.5–5.1)
PROGEST SERPL-MCNC: 9.39 NG/ML
PROLACTIN SERPL-MCNC: 4.7 NG/ML
PROT SERPL-MCNC: 7.8 G/DL (ref 5.7–8.2)
RBC # BLD AUTO: 4.81 X10(6)UL
SODIUM SERPL-SCNC: 142 MMOL/L (ref 136–145)
TOTAL IRON BINDING CAPACITY: 334 UG/DL (ref 250–425)
TRANSFERRIN SERPL-MCNC: 248 MG/DL (ref 250–380)
TRIGL SERPL-MCNC: 91 MG/DL (ref 30–149)
TSI SER-ACNC: 1.84 UIU/ML (ref 0.55–4.78)
VIT B12 SERPL-MCNC: 284 PG/ML (ref 211–911)
VIT D+METAB SERPL-MCNC: 18.9 NG/ML (ref 30–100)
VLDLC SERPL CALC-MCNC: 17 MG/DL (ref 0–30)
WBC # BLD AUTO: 6.8 X10(3) UL (ref 4–11)

## 2025-04-03 PROCEDURE — 84146 ASSAY OF PROLACTIN: CPT

## 2025-04-03 PROCEDURE — 84207 ASSAY OF VITAMIN B-6: CPT

## 2025-04-03 PROCEDURE — 83001 ASSAY OF GONADOTROPIN (FSH): CPT

## 2025-04-03 PROCEDURE — 83540 ASSAY OF IRON: CPT

## 2025-04-03 PROCEDURE — 85025 COMPLETE CBC W/AUTO DIFF WBC: CPT

## 2025-04-03 PROCEDURE — 84425 ASSAY OF VITAMIN B-1: CPT

## 2025-04-03 PROCEDURE — 82306 VITAMIN D 25 HYDROXY: CPT

## 2025-04-03 PROCEDURE — 85652 RBC SED RATE AUTOMATED: CPT

## 2025-04-03 PROCEDURE — 80061 LIPID PANEL: CPT

## 2025-04-03 PROCEDURE — 84410 TESTOSTERONE BIOAVAILABLE: CPT

## 2025-04-03 PROCEDURE — 84144 ASSAY OF PROGESTERONE: CPT

## 2025-04-03 PROCEDURE — 80053 COMPREHEN METABOLIC PANEL: CPT

## 2025-04-03 PROCEDURE — 82671 ASSAY OF ESTROGENS: CPT

## 2025-04-03 PROCEDURE — 83002 ASSAY OF GONADOTROPIN (LH): CPT

## 2025-04-03 PROCEDURE — 84443 ASSAY THYROID STIM HORMONE: CPT

## 2025-04-03 PROCEDURE — 83036 HEMOGLOBIN GLYCOSYLATED A1C: CPT

## 2025-04-03 PROCEDURE — 83550 IRON BINDING TEST: CPT

## 2025-04-03 PROCEDURE — 36415 COLL VENOUS BLD VENIPUNCTURE: CPT

## 2025-04-03 PROCEDURE — 82607 VITAMIN B-12: CPT

## 2025-04-07 LAB
ESTRADIOL: 24.6 PG/ML
ESTRONE: 100 PG/ML

## 2025-04-08 LAB — VITAMIN B1 WHOLE BLD: 107.4 NMOL/L

## 2025-04-09 ENCOUNTER — OFFICE VISIT (OUTPATIENT)
Dept: FAMILY MEDICINE CLINIC | Facility: CLINIC | Age: 55
End: 2025-04-09
Payer: COMMERCIAL

## 2025-04-09 VITALS
SYSTOLIC BLOOD PRESSURE: 118 MMHG | BODY MASS INDEX: 31.1 KG/M2 | WEIGHT: 210 LBS | OXYGEN SATURATION: 99 % | HEIGHT: 69 IN | DIASTOLIC BLOOD PRESSURE: 70 MMHG | HEART RATE: 94 BPM

## 2025-04-09 DIAGNOSIS — L65.9 HAIR LOSS: Primary | ICD-10-CM

## 2025-04-09 LAB
SEX HORM BIND GLOB: 65.3 NMOL/L
TESTOST % FREE+WEAK BND: 9.7 %
TESTOST FREE+WEAK BND: 2.5 NG/DL
TESTOSTERONE TOT /MS: 25.9 NG/DL

## 2025-04-09 PROCEDURE — 3078F DIAST BP <80 MM HG: CPT | Performed by: FAMILY MEDICINE

## 2025-04-09 PROCEDURE — 3074F SYST BP LT 130 MM HG: CPT | Performed by: FAMILY MEDICINE

## 2025-04-09 PROCEDURE — 99214 OFFICE O/P EST MOD 30 MIN: CPT | Performed by: FAMILY MEDICINE

## 2025-04-09 PROCEDURE — 3008F BODY MASS INDEX DOCD: CPT | Performed by: FAMILY MEDICINE

## 2025-04-09 RX ORDER — ONDANSETRON 4 MG/1
4 TABLET, ORALLY DISINTEGRATING ORAL EVERY 8 HOURS PRN
Qty: 15 TABLET | Refills: 5 | Status: SHIPPED | OUTPATIENT
Start: 2025-04-09

## 2025-04-09 RX ORDER — CLOBETASOL PROPIONATE 0.05 G/100ML
1 SHAMPOO TOPICAL
Qty: 118 ML | Refills: 5 | Status: SHIPPED | OUTPATIENT
Start: 2025-04-10

## 2025-04-09 NOTE — PROGRESS NOTES
Chief Complaint   Patient presents with    Follow - Up     Blood work    Headache     Pain -6 months         HPI:  Recently hair loss, mild to moderate, last few months, started gradually and worsening.No fatigue, no chest pains, no numbness no scalp issues.No new medications.  Some scalp tenderness.  Prior therapies:none  Pertinent social history:no alcohol, no drugs      Current Medications[1]   Past Medical History[2]   Past Surgical History[3]   Family History[4]   Short Social Hx on File[5]        ROS:  GEN: no weight loss or gain, no fever, no heat or cold intolerance.No night sweats  EYES: no vision issues  HEENT: .No tinnitus, no jaw pains, no sore throats.  NECK: no pain  CHEST: no chest pains, no SOB on exertion or at rest no palpations.No edema, no cough.  NEURO: no seizures, no confusion, no weakness, no abnormal sensation, no headaches.  GI: no nausea or vomiting, no abdominal pain  LYMPH: no tender glands.  MUSC: no arthralgia, myalgia  SKIN: no rashes  PSYCH: no depression, anxiety, irrability    EXAM:  /70   Pulse 94   Ht 5' 9\" (1.753 m)   Wt 210 lb (95.3 kg)   SpO2 99%   BMI 31.01 kg/m²   GEN: NAD, A,O x3, pleasant.  HEENT: PEERLA, EOM-i, normal oral mucosa, TM wnl mohan.NO teeth clenching  NECK: supple, no lymphadenopathy.  LUNGS: CTA mohan.  HEART:S1/S2 reg., no murmurs, clicks, gallops.  ABD: soft, BS present, NT,ND, no organomegaly.  LYMPH: normal cervical, axillary,  and inguinal lymph nodes.  NEURO: CN 2-12 intact, moving 4 extremities without difficulty, dtr 2+/4+ x 4 ext. No diff with hand eye coordination. No papilledema. A & O x 3 and answering questions without difficulty. Visual fields intact on confrontation.  PSYCH: normal mood.      ASSESSMENT / PLAN:     Shania was seen today for follow - up and headache.    Diagnoses and all orders for this visit:    Hair loss    Other orders  -     ondansetron 4 MG Oral Tablet Dispersible; Take 1 tablet (4 mg total) by mouth every 8  (eight) hours as needed for Nausea.  -     Clobetasol Propionate 0.05 % External Shampoo; Apply 1 Application topically twice a week.         Return to Office: 3 months.         [1]   Current Outpatient Medications   Medication Sig Dispense Refill    ondansetron 4 MG Oral Tablet Dispersible Take 1 tablet (4 mg total) by mouth every 8 (eight) hours as needed for Nausea. 15 tablet 5    [START ON 4/10/2025] Clobetasol Propionate 0.05 % External Shampoo Apply 1 Application topically twice a week. 118 mL 5    ergocalciferol 1.25 MG (50698 UT) Oral Cap Take 1 capsule (50,000 Units total) by mouth once a week for 4 months. 16 capsule 0    Tirzepatide-Weight Management (ZEPBOUND) 10 MG/0.5ML Subcutaneous Solution Auto-injector Inject 10 mg into the skin once a week for 4 doses. 2 mL 0    progesterone 200 MG Oral Cap Take 100 mg by mouth nightly.      Tirzepatide-Weight Management (ZEPBOUND) 7.5 MG/0.5ML Subcutaneous Solution Auto-injector Inject 7.5 mg into the skin once a week. (Patient not taking: Reported on 2025) 2 mL 0    methylPREDNISolone (MEDROL) 4 MG Oral Tablet Therapy Pack As directed. (Patient not taking: Reported on 10/22/2024) 1 each 0    lidocaine (LIDODERM) 5 % External Patch Place 1 patch onto the skin daily. On the right arm. (Patient not taking: Reported on 10/22/2024) 30 patch 3    estradiol 0.5 MG Oral Tab Take 1 tablet (0.5 mg total) by mouth daily.      Vitamin D, Cholecalciferol, 25 MCG (1000 UT) Oral Tab Take 1,000 Units by mouth daily.      Magnesium Citrate 100 MG Oral Tab Take 100 mg by mouth daily.     [2] History reviewed. No pertinent past medical history.  [3]   Past Surgical History:  Procedure Laterality Date         [4] History reviewed. No pertinent family history.  [5]   Social History  Socioeconomic History    Marital status:    Tobacco Use    Smoking status: Never    Smokeless tobacco: Never   Substance and Sexual Activity    Alcohol use: Never    Drug use:  Never   Other Topics Concern    Caffeine Concern Yes    Exercise Yes    Seat Belt Yes    Stress Concern No    Weight Concern No     Social Drivers of Health     Food Insecurity: No Food Insecurity (4/9/2025)    NCSS - Food Insecurity     Worried About Running Out of Food in the Last Year: No     Ran Out of Food in the Last Year: No   Transportation Needs: No Transportation Needs (4/9/2025)    NCSS - Transportation     Lack of Transportation: No   Housing Stability: Not At Risk (4/9/2025)    NCSS - Housing/Utilities     Has Housing: Yes     Worried About Losing Housing: No     Unable to Get Utilities: No

## 2025-04-10 LAB — VITAMIN B6: 9.1 UG/L

## 2025-04-14 ENCOUNTER — PATIENT MESSAGE (OUTPATIENT)
Facility: CLINIC | Age: 55
End: 2025-04-14

## 2025-04-14 DIAGNOSIS — E78.5 HYPERLIPIDEMIA, UNSPECIFIED HYPERLIPIDEMIA TYPE: ICD-10-CM

## 2025-04-14 DIAGNOSIS — E66.811 CLASS 1 OBESITY WITH SERIOUS COMORBIDITY AND BODY MASS INDEX (BMI) OF 31.0 TO 31.9 IN ADULT, UNSPECIFIED OBESITY TYPE: Primary | ICD-10-CM

## 2025-04-14 DIAGNOSIS — R73.03 PREDIABETES: ICD-10-CM

## 2025-04-22 NOTE — TELEPHONE ENCOUNTER
Requesting zepbound 10 mg  LOV: 2/7/25  RTC: not noted  Last Relevant Labs: na  Filled: 3/20/25 #2ml with 0 refills 10 mg dose    Future Appointments   Date Time Provider Department Center   5/6/2025 10:00 AM Patti Castillo PA-C [facility] [address]

## 2025-04-24 RX ORDER — TIRZEPATIDE 10 MG/.5ML
10 INJECTION, SOLUTION SUBCUTANEOUS WEEKLY
Qty: 2 ML | Refills: 1 | Status: SHIPPED | OUTPATIENT
Start: 2025-04-24 | End: 2025-05-16

## 2025-05-06 ENCOUNTER — TELEMEDICINE (OUTPATIENT)
Facility: CLINIC | Age: 55
End: 2025-05-06
Payer: COMMERCIAL

## 2025-05-06 DIAGNOSIS — Z51.81 ENCOUNTER FOR THERAPEUTIC DRUG LEVEL MONITORING: Primary | ICD-10-CM

## 2025-05-06 DIAGNOSIS — E78.5 HYPERLIPIDEMIA, UNSPECIFIED HYPERLIPIDEMIA TYPE: ICD-10-CM

## 2025-05-06 DIAGNOSIS — E66.811 CLASS 1 OBESITY WITH SERIOUS COMORBIDITY AND BODY MASS INDEX (BMI) OF 31.0 TO 31.9 IN ADULT, UNSPECIFIED OBESITY TYPE: ICD-10-CM

## 2025-05-06 DIAGNOSIS — R73.03 PREDIABETES: ICD-10-CM

## 2025-05-06 DIAGNOSIS — E53.8 VITAMIN B12 DEFICIENCY: ICD-10-CM

## 2025-05-06 DIAGNOSIS — E55.9 VITAMIN D DEFICIENCY: ICD-10-CM

## 2025-05-06 PROCEDURE — 98005 SYNCH AUDIO-VIDEO EST LOW 20: CPT | Performed by: PHYSICIAN ASSISTANT

## 2025-05-06 NOTE — PROGRESS NOTES
This visit is conducted using Telemedicine with live, interactive video and audio.    Patient has been referred to the [facility] website at [email or web address] to review the yearly Consent to Treat document.    Patient understands and accepts financial responsibility for any deductible, co-insurance and/or co-pays associated with this service.      HISTORY OF PRESENT ILLNESS  Chief Complaint   Patient presents with    Weight Check       Shania Lyons is a 54 year old female here for follow up in medical weight loss program.   205.8lbs  Pt is compliant on zepbound  Denies chest pain, shortness of breath, dizziness, blurred vision, headache, paresthesia, vomiting.   2 days after the shot will feel nauseous that morning  Prioritizing protein and produce, does feel like she could do better with the produce portion of it  Craving for sweets is down    Exercise/Activity: got a  at the gym, incorporating more strength, walking  Nutrition: 24 hour food log reviewed, eating regular meals, +protein  Stress: manageable  Sleep: always an issue with her job    [facility] Follow Up    General Information  Nutrition Recall  Exercise     Sleep              Wt Readings from Last 6 Encounters:   04/09/25 210 lb (95.3 kg)   02/07/25 214 lb (97.1 kg)   10/22/24 205 lb (93 kg)   01/16/24 242 lb (109.8 kg)   06/02/23 239 lb (108.4 kg)            Breakfast Lunch Dinner Snacks Fluids   Reviewed           REVIEW OF SYSTEMS  GENERAL HEALTH: feels well otherwise, denied any fevers chills or night sweats   RESPIRATORY: denies shortness of breath   CARDIOVASCULAR: denies chest pain  GI: denies abdominal pain    EXAM  There were no vitals taken for this visit.  GENERAL: well developed, well nourished,in no apparent distress, A/O x3  SKIN: no rashes,no suspicious lesions on visible skin  HEENT: atraumatic, normocephalic  LUNGS: no increased effort or work with breathing   NEURO: speaking fluently and in clear sentences    Lab Results    Component Value Date    WBC 6.8 04/03/2025    RBC 4.81 04/03/2025    HGB 14.2 04/03/2025    HCT 43.1 04/03/2025    MCV 89.6 04/03/2025    MCH 29.5 04/03/2025    MCHC 32.9 04/03/2025    RDW 12.6 04/03/2025    .0 04/03/2025     Lab Results   Component Value Date    GLU 95 04/03/2025    BUN 15 04/03/2025    CREATSERUM 0.98 04/03/2025    ANIONGAP 9 04/03/2025    CA 10.1 04/03/2025    OSMOCALC 295 04/03/2025    ALKPHO 73 04/03/2025    AST 17 04/03/2025    ALT 11 04/03/2025    BILT 0.5 04/03/2025    TP 7.8 04/03/2025    ALB 5.1 (H) 04/03/2025    GLOBULIN 2.7 04/03/2025     04/03/2025    K 4.3 04/03/2025     04/03/2025    CO2 27.0 04/03/2025     Lab Results   Component Value Date     04/03/2025    A1C 5.6 04/03/2025     Lab Results   Component Value Date    CHOLEST 233 (H) 04/03/2025    TRIG 91 04/03/2025    HDL 63 (H) 04/03/2025     (H) 04/03/2025    VLDL 17 04/03/2025    NONHDLC 170 (H) 04/03/2025     Lab Results   Component Value Date    TSH 1.836 04/03/2025     Lab Results   Component Value Date    B12 284 04/03/2025     Lab Results   Component Value Date    VITD 18.9 (L) 04/03/2025       Medications Ordered Prior to Encounter[1]    ASSESSMENT  Analyzed weight data:       Diagnoses and all orders for this visit:    Encounter for therapeutic drug level monitoring    Class 1 obesity with serious comorbidity and body mass index (BMI) of 31.0 to 31.9 in adult, unspecified obesity type    Prediabetes    Hyperlipidemia, unspecified hyperlipidemia type    Vitamin D deficiency    Vitamin B12 deficiency        PLAN  Initial Weight: 242lbs  Initial Weight Date: 1/16/24  Today's Weight: 206lbs  5% Goal: 12.1lbs  10% Goal: 24.2lbs  Total Weight Loss: Net loss 36lbs    Will continue zepbound - advised side effects and adverse effects of medication   Prediabetes - continue current medications, low carb diet  HLD - lifestyle changes  Reviewed recent labs  Vit D and B12 supplement, take with  food  Consistency with logging foods - protein and produce  Incorporate strength/resistance training  Nutrition: low carb diet/ recommended to eat breakfast daily/ regular protein intake  Medication use and side effects reviewed with patient.  Medication contraindications: EKG prior to stimulant  Follow up with dietitian and psychologist as recommended.  Discussed the role of sleep and stress in weight management.  Counseled on comprehensive weight loss plan including attention to nutrition, exercise and behavior/stress management for success. See patient instruction below for more details.  Discussed strategies to overcome barriers to successful weight loss and weight maintenance  FITTE: ACSM recommendations (150-300 minutes/ week in active weight loss)   Weight Loss consent to treat reviewed and signed     There are no Patient Instructions on file for this visit.    No follow-ups on file.    Patient verbalizes understanding.    Patti Castillo PA-C  5/6/2025    Please note that the following visit was completed using two-way, real-time interactive audio and video communication.  This has been done in good jack to provide continuity of care in the best interest of the provider-patient relationship, due to the ongoing public health crisis/national emergency and because of restrictions of visitation.  There are limitations of this visit as no physical exam could be performed.  Every conscious effort was taken to allow for sufficient and adequate time.  This billing was spent on reviewing labs, medications, radiology tests and decision making.  Appropriate medical decision-making and tests are ordered as detailed in the plan of care above    Patti Castillo PA-C           [1]   Current Outpatient Medications on File Prior to Visit   Medication Sig Dispense Refill    Tirzepatide-Weight Management (ZEPBOUND) 10 MG/0.5ML Subcutaneous Solution Auto-injector Inject 10 mg into the skin once a week for 4 doses. 2 mL 1     ondansetron 4 MG Oral Tablet Dispersible Take 1 tablet (4 mg total) by mouth every 8 (eight) hours as needed for Nausea. 15 tablet 5    Clobetasol Propionate 0.05 % External Shampoo Apply 1 Application topically twice a week. 118 mL 5    ergocalciferol 1.25 MG (87629 UT) Oral Cap Take 1 capsule (50,000 Units total) by mouth once a week for 4 months. 16 capsule 0    Tirzepatide-Weight Management (ZEPBOUND) 7.5 MG/0.5ML Subcutaneous Solution Auto-injector Inject 7.5 mg into the skin once a week. (Patient not taking: Reported on 4/9/2025) 2 mL 0    methylPREDNISolone (MEDROL) 4 MG Oral Tablet Therapy Pack As directed. (Patient not taking: Reported on 10/22/2024) 1 each 0    lidocaine (LIDODERM) 5 % External Patch Place 1 patch onto the skin daily. On the right arm. (Patient not taking: Reported on 10/22/2024) 30 patch 3    estradiol 0.5 MG Oral Tab Take 1 tablet (0.5 mg total) by mouth daily.      progesterone 200 MG Oral Cap Take 100 mg by mouth nightly.      Vitamin D, Cholecalciferol, 25 MCG (1000 UT) Oral Tab Take 1,000 Units by mouth daily.      Magnesium Citrate 100 MG Oral Tab Take 100 mg by mouth daily.       No current facility-administered medications on file prior to visit.

## 2025-06-17 ENCOUNTER — PATIENT MESSAGE (OUTPATIENT)
Facility: CLINIC | Age: 55
End: 2025-06-17

## 2025-06-17 DIAGNOSIS — R73.03 PREDIABETES: ICD-10-CM

## 2025-06-17 DIAGNOSIS — E66.811 CLASS 1 OBESITY WITH SERIOUS COMORBIDITY AND BODY MASS INDEX (BMI) OF 31.0 TO 31.9 IN ADULT, UNSPECIFIED OBESITY TYPE: Primary | ICD-10-CM

## 2025-06-17 DIAGNOSIS — E78.5 HYPERLIPIDEMIA, UNSPECIFIED HYPERLIPIDEMIA TYPE: ICD-10-CM

## 2025-06-18 RX ORDER — TIRZEPATIDE 10 MG/.5ML
10 INJECTION, SOLUTION SUBCUTANEOUS WEEKLY
Qty: 6 ML | Refills: 0 | Status: SHIPPED | OUTPATIENT
Start: 2025-06-18

## 2025-06-18 NOTE — TELEPHONE ENCOUNTER
Requesting zepbound 10 mg  LOV: 5/6/25  RTC: not noted  Last Relevant Labs: na  Filled: 4/24/25 #2 ml with 1 refills  10 mg dose    No future appointments.    4/9/25 210#  now 203#    Patient will lose coverage of zepbound 7/1/25 and asking for 2 month order of zepbound - I have pended 3 month

## 2025-06-25 RX ORDER — ERGOCALCIFEROL 1.25 MG/1
CAPSULE, LIQUID FILLED ORAL
Qty: 12 CAPSULE | Refills: 0 | Status: SHIPPED | OUTPATIENT
Start: 2025-06-25

## 2025-07-31 ENCOUNTER — OFFICE VISIT (OUTPATIENT)
Facility: CLINIC | Age: 55
End: 2025-07-31
Payer: COMMERCIAL

## 2025-07-31 VITALS
HEART RATE: 80 BPM | OXYGEN SATURATION: 98 % | BODY MASS INDEX: 29.77 KG/M2 | HEIGHT: 69 IN | SYSTOLIC BLOOD PRESSURE: 108 MMHG | WEIGHT: 201 LBS | RESPIRATION RATE: 18 BRPM | DIASTOLIC BLOOD PRESSURE: 70 MMHG

## 2025-07-31 DIAGNOSIS — E53.8 VITAMIN B12 DEFICIENCY: ICD-10-CM

## 2025-07-31 DIAGNOSIS — R73.03 PREDIABETES: ICD-10-CM

## 2025-07-31 DIAGNOSIS — Z51.81 ENCOUNTER FOR THERAPEUTIC DRUG LEVEL MONITORING: Primary | ICD-10-CM

## 2025-07-31 DIAGNOSIS — E66.9 CHRONIC OBESITY NOT AFFECTING CURRENT EPISODE OF CARE: ICD-10-CM

## 2025-07-31 DIAGNOSIS — E66.3 OVERWEIGHT (BMI 25.0-29.9): ICD-10-CM

## 2025-07-31 DIAGNOSIS — E78.5 HYPERLIPIDEMIA, UNSPECIFIED HYPERLIPIDEMIA TYPE: ICD-10-CM

## 2025-07-31 DIAGNOSIS — E55.9 VITAMIN D DEFICIENCY: ICD-10-CM

## 2025-07-31 PROCEDURE — 99214 OFFICE O/P EST MOD 30 MIN: CPT | Performed by: PHYSICIAN ASSISTANT

## 2025-07-31 PROCEDURE — 3008F BODY MASS INDEX DOCD: CPT | Performed by: PHYSICIAN ASSISTANT

## 2025-07-31 PROCEDURE — 3078F DIAST BP <80 MM HG: CPT | Performed by: PHYSICIAN ASSISTANT

## 2025-07-31 PROCEDURE — 3074F SYST BP LT 130 MM HG: CPT | Performed by: PHYSICIAN ASSISTANT

## 2025-07-31 RX ORDER — ESTRADIOL 0.05 MG/D
1 PATCH TRANSDERMAL WEEKLY
COMMUNITY
Start: 2025-01-01

## 2025-08-04 ENCOUNTER — TELEPHONE (OUTPATIENT)
Dept: INTERNAL MEDICINE CLINIC | Facility: CLINIC | Age: 55
End: 2025-08-04

## 2025-08-22 ENCOUNTER — PATIENT MESSAGE (OUTPATIENT)
Facility: CLINIC | Age: 55
End: 2025-08-22

## 2025-08-22 DIAGNOSIS — E78.5 HYPERLIPIDEMIA, UNSPECIFIED HYPERLIPIDEMIA TYPE: ICD-10-CM

## 2025-08-22 DIAGNOSIS — E66.3 OVERWEIGHT (BMI 25.0-29.9): ICD-10-CM

## 2025-08-22 DIAGNOSIS — R73.03 PREDIABETES: ICD-10-CM

## 2025-08-22 DIAGNOSIS — E66.9 CHRONIC OBESITY NOT AFFECTING CURRENT EPISODE OF CARE: Primary | ICD-10-CM
